# Patient Record
Sex: MALE | Race: WHITE | NOT HISPANIC OR LATINO | Employment: STUDENT | URBAN - METROPOLITAN AREA
[De-identification: names, ages, dates, MRNs, and addresses within clinical notes are randomized per-mention and may not be internally consistent; named-entity substitution may affect disease eponyms.]

---

## 2024-08-05 ENCOUNTER — OFFICE VISIT (OUTPATIENT)
Dept: GASTROENTEROLOGY | Facility: CLINIC | Age: 19
End: 2024-08-05
Payer: COMMERCIAL

## 2024-08-05 ENCOUNTER — HOSPITAL ENCOUNTER (EMERGENCY)
Facility: HOSPITAL | Age: 19
Discharge: HOME | End: 2024-08-05
Attending: EMERGENCY MEDICINE
Payer: COMMERCIAL

## 2024-08-05 ENCOUNTER — HOSPITAL ENCOUNTER (OUTPATIENT)
Dept: RADIOLOGY | Facility: HOSPITAL | Age: 19
Discharge: HOME | End: 2024-08-05
Payer: COMMERCIAL

## 2024-08-05 ENCOUNTER — HOSPITAL ENCOUNTER (INPATIENT)
Facility: HOSPITAL | Age: 19
LOS: 1 days | Discharge: HOME | End: 2024-08-06
Attending: EMERGENCY MEDICINE | Admitting: SURGERY
Payer: COMMERCIAL

## 2024-08-05 ENCOUNTER — DOCUMENTATION (OUTPATIENT)
Dept: GASTROENTEROLOGY | Facility: CLINIC | Age: 19
End: 2024-08-05
Payer: COMMERCIAL

## 2024-08-05 VITALS
SYSTOLIC BLOOD PRESSURE: 101 MMHG | DIASTOLIC BLOOD PRESSURE: 66 MMHG | HEART RATE: 74 BPM | WEIGHT: 166 LBS | TEMPERATURE: 98.4 F | BODY MASS INDEX: 25.16 KG/M2 | HEIGHT: 68 IN

## 2024-08-05 VITALS
RESPIRATION RATE: 16 BRPM | BODY MASS INDEX: 25.01 KG/M2 | SYSTOLIC BLOOD PRESSURE: 141 MMHG | WEIGHT: 165 LBS | DIASTOLIC BLOOD PRESSURE: 86 MMHG | OXYGEN SATURATION: 96 % | HEIGHT: 68 IN | TEMPERATURE: 97.6 F | HEART RATE: 86 BPM

## 2024-08-05 DIAGNOSIS — R10.813 RIGHT LOWER QUADRANT ABDOMINAL TENDERNESS, REBOUND TENDERNESS PRESENCE NOT SPECIFIED: ICD-10-CM

## 2024-08-05 DIAGNOSIS — R10.2 SUPRAPUBIC ABDOMINAL PAIN: ICD-10-CM

## 2024-08-05 DIAGNOSIS — R10.2 SUPRAPUBIC ABDOMINAL PAIN: Primary | ICD-10-CM

## 2024-08-05 DIAGNOSIS — R10.33 PERIUMBILICAL ABDOMINAL PAIN: ICD-10-CM

## 2024-08-05 DIAGNOSIS — K35.80 ACUTE APPENDICITIS, UNSPECIFIED ACUTE APPENDICITIS TYPE: Primary | ICD-10-CM

## 2024-08-05 DIAGNOSIS — R10.33 PERIUMBILICAL ABDOMINAL PAIN: Primary | ICD-10-CM

## 2024-08-05 DIAGNOSIS — D72.829 LEUKOCYTOSIS, UNSPECIFIED TYPE: ICD-10-CM

## 2024-08-05 LAB
ABO GROUP (TYPE) IN BLOOD: NORMAL
ALBUMIN SERPL BCP-MCNC: 4.7 G/DL (ref 3.4–5)
ALP SERPL-CCNC: 50 U/L (ref 33–120)
ALT SERPL W P-5'-P-CCNC: 11 U/L (ref 10–52)
ANION GAP SERPL CALC-SCNC: 13 MMOL/L (ref 10–20)
ANION GAP SERPL CALC-SCNC: 14 MMOL/L (ref 10–20)
ANTIBODY SCREEN: NORMAL
AST SERPL W P-5'-P-CCNC: 15 U/L (ref 9–39)
BASOPHILS # BLD AUTO: 0.04 X10*3/UL (ref 0–0.1)
BASOPHILS # BLD AUTO: 0.08 X10*3/UL (ref 0–0.1)
BASOPHILS NFR BLD AUTO: 0.3 %
BASOPHILS NFR BLD AUTO: 0.6 %
BILIRUB SERPL-MCNC: 0.9 MG/DL (ref 0–1.2)
BUN SERPL-MCNC: 13 MG/DL (ref 6–23)
BUN SERPL-MCNC: 9 MG/DL (ref 6–23)
CALCIUM SERPL-MCNC: 8.5 MG/DL (ref 8.6–10.6)
CALCIUM SERPL-MCNC: 9.9 MG/DL (ref 8.6–10.6)
CHLORIDE SERPL-SCNC: 101 MMOL/L (ref 98–107)
CHLORIDE SERPL-SCNC: 102 MMOL/L (ref 98–107)
CO2 SERPL-SCNC: 25 MMOL/L (ref 21–32)
CO2 SERPL-SCNC: 28 MMOL/L (ref 21–32)
CREAT SERPL-MCNC: 0.81 MG/DL (ref 0.5–1.3)
CREAT SERPL-MCNC: 0.89 MG/DL (ref 0.5–1.3)
EGFRCR SERPLBLD CKD-EPI 2021: >90 ML/MIN/1.73M*2
EGFRCR SERPLBLD CKD-EPI 2021: >90 ML/MIN/1.73M*2
EOSINOPHIL # BLD AUTO: 0.1 X10*3/UL (ref 0–0.7)
EOSINOPHIL # BLD AUTO: 0.16 X10*3/UL (ref 0–0.7)
EOSINOPHIL NFR BLD AUTO: 0.8 %
EOSINOPHIL NFR BLD AUTO: 1.1 %
ERYTHROCYTE [DISTWIDTH] IN BLOOD BY AUTOMATED COUNT: 12.6 % (ref 11.5–14.5)
ERYTHROCYTE [DISTWIDTH] IN BLOOD BY AUTOMATED COUNT: 12.7 % (ref 11.5–14.5)
GLUCOSE SERPL-MCNC: 108 MG/DL (ref 74–99)
GLUCOSE SERPL-MCNC: 94 MG/DL (ref 74–99)
HCT VFR BLD AUTO: 39.5 % (ref 41–52)
HCT VFR BLD AUTO: 46.5 % (ref 41–52)
HGB BLD-MCNC: 14 G/DL (ref 13.5–17.5)
HGB BLD-MCNC: 15.9 G/DL (ref 13.5–17.5)
IMM GRANULOCYTES # BLD AUTO: 0.03 X10*3/UL (ref 0–0.7)
IMM GRANULOCYTES # BLD AUTO: 0.04 X10*3/UL (ref 0–0.7)
IMM GRANULOCYTES NFR BLD AUTO: 0.2 % (ref 0–0.9)
IMM GRANULOCYTES NFR BLD AUTO: 0.3 % (ref 0–0.9)
LYMPHOCYTES # BLD AUTO: 1.23 X10*3/UL (ref 1.2–4.8)
LYMPHOCYTES # BLD AUTO: 1.43 X10*3/UL (ref 1.2–4.8)
LYMPHOCYTES NFR BLD AUTO: 10.3 %
LYMPHOCYTES NFR BLD AUTO: 9.9 %
MAGNESIUM SERPL-MCNC: 1.94 MG/DL (ref 1.6–2.4)
MCH RBC QN AUTO: 29.5 PG (ref 26–34)
MCH RBC QN AUTO: 30.3 PG (ref 26–34)
MCHC RBC AUTO-ENTMCNC: 34.2 G/DL (ref 32–36)
MCHC RBC AUTO-ENTMCNC: 35.4 G/DL (ref 32–36)
MCV RBC AUTO: 83 FL (ref 80–100)
MCV RBC AUTO: 89 FL (ref 80–100)
MONOCYTES # BLD AUTO: 0.97 X10*3/UL (ref 0.1–1)
MONOCYTES # BLD AUTO: 1.07 X10*3/UL (ref 0.1–1)
MONOCYTES NFR BLD AUTO: 7.4 %
MONOCYTES NFR BLD AUTO: 8.1 %
NEUTROPHILS # BLD AUTO: 11.65 X10*3/UL (ref 1.2–7.7)
NEUTROPHILS # BLD AUTO: 9.6 X10*3/UL (ref 1.2–7.7)
NEUTROPHILS NFR BLD AUTO: 80.2 %
NEUTROPHILS NFR BLD AUTO: 80.8 %
NRBC BLD-RTO: 0 /100 WBCS (ref 0–0)
NRBC BLD-RTO: 0 /100 WBCS (ref 0–0)
PLATELET # BLD AUTO: 280 X10*3/UL (ref 150–450)
PLATELET # BLD AUTO: 311 X10*3/UL (ref 150–450)
POTASSIUM SERPL-SCNC: 3.9 MMOL/L (ref 3.5–5.3)
POTASSIUM SERPL-SCNC: 4.4 MMOL/L (ref 3.5–5.3)
PROT SERPL-MCNC: 6.9 G/DL (ref 6.4–8.2)
RBC # BLD AUTO: 4.74 X10*6/UL (ref 4.5–5.9)
RBC # BLD AUTO: 5.25 X10*6/UL (ref 4.5–5.9)
RH FACTOR (ANTIGEN D): NORMAL
SODIUM SERPL-SCNC: 137 MMOL/L (ref 136–145)
SODIUM SERPL-SCNC: 138 MMOL/L (ref 136–145)
WBC # BLD AUTO: 12 X10*3/UL (ref 4.4–11.3)
WBC # BLD AUTO: 14.4 X10*3/UL (ref 4.4–11.3)

## 2024-08-05 PROCEDURE — 99223 1ST HOSP IP/OBS HIGH 75: CPT | Performed by: SURGERY

## 2024-08-05 PROCEDURE — 96365 THER/PROPH/DIAG IV INF INIT: CPT

## 2024-08-05 PROCEDURE — 2500000004 HC RX 250 GENERAL PHARMACY W/ HCPCS (ALT 636 FOR OP/ED)

## 2024-08-05 PROCEDURE — 2550000001 HC RX 255 CONTRASTS: Performed by: NURSE PRACTITIONER

## 2024-08-05 PROCEDURE — 2500000004 HC RX 250 GENERAL PHARMACY W/ HCPCS (ALT 636 FOR OP/ED): Performed by: STUDENT IN AN ORGANIZED HEALTH CARE EDUCATION/TRAINING PROGRAM

## 2024-08-05 PROCEDURE — 96361 HYDRATE IV INFUSION ADD-ON: CPT

## 2024-08-05 PROCEDURE — 82374 ASSAY BLOOD CARBON DIOXIDE: CPT | Performed by: STUDENT IN AN ORGANIZED HEALTH CARE EDUCATION/TRAINING PROGRAM

## 2024-08-05 PROCEDURE — 99204 OFFICE O/P NEW MOD 45 MIN: CPT | Performed by: NURSE PRACTITIONER

## 2024-08-05 PROCEDURE — 2500000001 HC RX 250 WO HCPCS SELF ADMINISTERED DRUGS (ALT 637 FOR MEDICARE OP)

## 2024-08-05 PROCEDURE — 99285 EMERGENCY DEPT VISIT HI MDM: CPT | Mod: 25

## 2024-08-05 PROCEDURE — 1036F TOBACCO NON-USER: CPT | Performed by: NURSE PRACTITIONER

## 2024-08-05 PROCEDURE — 99284 EMERGENCY DEPT VISIT MOD MDM: CPT | Performed by: EMERGENCY MEDICINE

## 2024-08-05 PROCEDURE — 74177 CT ABD & PELVIS W/CONTRAST: CPT

## 2024-08-05 PROCEDURE — 99214 OFFICE O/P EST MOD 30 MIN: CPT | Performed by: NURSE PRACTITIONER

## 2024-08-05 PROCEDURE — 85025 COMPLETE CBC W/AUTO DIFF WBC: CPT

## 2024-08-05 PROCEDURE — A9698 NON-RAD CONTRAST MATERIALNOC: HCPCS | Performed by: NURSE PRACTITIONER

## 2024-08-05 PROCEDURE — 80053 COMPREHEN METABOLIC PANEL: CPT

## 2024-08-05 PROCEDURE — 1210000001 HC SEMI-PRIVATE ROOM DAILY

## 2024-08-05 PROCEDURE — 96375 TX/PRO/DX INJ NEW DRUG ADDON: CPT

## 2024-08-05 PROCEDURE — 36415 COLL VENOUS BLD VENIPUNCTURE: CPT | Performed by: STUDENT IN AN ORGANIZED HEALTH CARE EDUCATION/TRAINING PROGRAM

## 2024-08-05 PROCEDURE — 80048 BASIC METABOLIC PNL TOTAL CA: CPT | Mod: CCI | Performed by: STUDENT IN AN ORGANIZED HEALTH CARE EDUCATION/TRAINING PROGRAM

## 2024-08-05 PROCEDURE — 2500000004 HC RX 250 GENERAL PHARMACY W/ HCPCS (ALT 636 FOR OP/ED): Performed by: EMERGENCY MEDICINE

## 2024-08-05 PROCEDURE — 36415 COLL VENOUS BLD VENIPUNCTURE: CPT

## 2024-08-05 PROCEDURE — 86901 BLOOD TYPING SEROLOGIC RH(D): CPT | Performed by: STUDENT IN AN ORGANIZED HEALTH CARE EDUCATION/TRAINING PROGRAM

## 2024-08-05 PROCEDURE — 74177 CT ABD & PELVIS W/CONTRAST: CPT | Performed by: RADIOLOGY

## 2024-08-05 PROCEDURE — 83735 ASSAY OF MAGNESIUM: CPT

## 2024-08-05 PROCEDURE — 85025 COMPLETE CBC W/AUTO DIFF WBC: CPT | Performed by: STUDENT IN AN ORGANIZED HEALTH CARE EDUCATION/TRAINING PROGRAM

## 2024-08-05 RX ORDER — ACETAMINOPHEN 325 MG/1
975 TABLET ORAL ONCE
Status: COMPLETED | OUTPATIENT
Start: 2024-08-05 | End: 2024-08-05

## 2024-08-05 RX ORDER — ENOXAPARIN SODIUM 100 MG/ML
40 INJECTION SUBCUTANEOUS DAILY
Status: DISCONTINUED | OUTPATIENT
Start: 2024-08-06 | End: 2024-08-07 | Stop reason: HOSPADM

## 2024-08-05 RX ORDER — ACETAMINOPHEN, DIPHENHYDRAMINE HCL, PHENYLEPHRINE HCL 325; 25; 5 MG/1; MG/1; MG/1
10 TABLET ORAL NIGHTLY PRN
COMMUNITY

## 2024-08-05 RX ORDER — KETOROLAC TROMETHAMINE 30 MG/ML
30 INJECTION, SOLUTION INTRAMUSCULAR; INTRAVENOUS ONCE
Status: COMPLETED | OUTPATIENT
Start: 2024-08-05 | End: 2024-08-05

## 2024-08-05 RX ORDER — DEXTROAMPHETAMINE SACCHARATE, AMPHETAMINE ASPARTATE, DEXTROAMPHETAMINE SULFATE AND AMPHETAMINE SULFATE 2.5; 2.5; 2.5; 2.5 MG/1; MG/1; MG/1; MG/1
TABLET ORAL
COMMUNITY
Start: 2023-08-16 | End: 2024-08-05

## 2024-08-05 RX ORDER — METHYLPHENIDATE HYDROCHLORIDE 36 MG/1
36 TABLET ORAL EVERY MORNING
COMMUNITY

## 2024-08-05 RX ORDER — OXYCODONE HYDROCHLORIDE 5 MG/1
5 TABLET ORAL EVERY 6 HOURS PRN
Status: DISCONTINUED | OUTPATIENT
Start: 2024-08-05 | End: 2024-08-06

## 2024-08-05 RX ORDER — NALOXONE HYDROCHLORIDE 0.4 MG/ML
0.2 INJECTION, SOLUTION INTRAMUSCULAR; INTRAVENOUS; SUBCUTANEOUS EVERY 5 MIN PRN
Status: DISCONTINUED | OUTPATIENT
Start: 2024-08-05 | End: 2024-08-06

## 2024-08-05 RX ORDER — DIPHENHYDRAMINE HYDROCHLORIDE 50 MG/ML
25 INJECTION INTRAMUSCULAR; INTRAVENOUS ONCE
Status: DISCONTINUED | OUTPATIENT
Start: 2024-08-05 | End: 2024-08-05

## 2024-08-05 RX ORDER — ONDANSETRON 4 MG/1
4 TABLET, ORALLY DISINTEGRATING ORAL EVERY 8 HOURS PRN
Status: DISCONTINUED | OUTPATIENT
Start: 2024-08-05 | End: 2024-08-07 | Stop reason: HOSPADM

## 2024-08-05 RX ORDER — SODIUM CHLORIDE, SODIUM LACTATE, POTASSIUM CHLORIDE, CALCIUM CHLORIDE 600; 310; 30; 20 MG/100ML; MG/100ML; MG/100ML; MG/100ML
100 INJECTION, SOLUTION INTRAVENOUS CONTINUOUS
Status: DISCONTINUED | OUTPATIENT
Start: 2024-08-05 | End: 2024-08-06

## 2024-08-05 RX ORDER — DICYCLOMINE HYDROCHLORIDE 10 MG/1
10 CAPSULE ORAL 4 TIMES DAILY
Qty: 120 CAPSULE | Refills: 0 | Status: SHIPPED | OUTPATIENT
Start: 2024-08-05 | End: 2024-08-06 | Stop reason: HOSPADM

## 2024-08-05 RX ORDER — ACETAMINOPHEN 325 MG/1
650 TABLET ORAL EVERY 4 HOURS PRN
Status: DISCONTINUED | OUTPATIENT
Start: 2024-08-05 | End: 2024-08-07 | Stop reason: HOSPADM

## 2024-08-05 RX ORDER — AMOXICILLIN 250 MG
2 CAPSULE ORAL 2 TIMES DAILY
Status: DISCONTINUED | OUTPATIENT
Start: 2024-08-05 | End: 2024-08-07 | Stop reason: HOSPADM

## 2024-08-05 RX ORDER — KETOROLAC TROMETHAMINE 30 MG/ML
INJECTION, SOLUTION INTRAMUSCULAR; INTRAVENOUS
Status: COMPLETED
Start: 2024-08-05 | End: 2024-08-05

## 2024-08-05 RX ORDER — OXYCODONE HYDROCHLORIDE 5 MG/1
10 TABLET ORAL EVERY 4 HOURS PRN
Status: DISCONTINUED | OUTPATIENT
Start: 2024-08-05 | End: 2024-08-06

## 2024-08-05 RX ORDER — DICYCLOMINE HYDROCHLORIDE 10 MG/1
10 CAPSULE ORAL ONCE
Status: COMPLETED | OUTPATIENT
Start: 2024-08-05 | End: 2024-08-05

## 2024-08-05 RX ORDER — DEXTROSE, SODIUM CHLORIDE, SODIUM LACTATE, POTASSIUM CHLORIDE, AND CALCIUM CHLORIDE 5; .6; .31; .03; .02 G/100ML; G/100ML; G/100ML; G/100ML; G/100ML
100 INJECTION, SOLUTION INTRAVENOUS CONTINUOUS
Status: DISCONTINUED | OUTPATIENT
Start: 2024-08-05 | End: 2024-08-05

## 2024-08-05 RX ORDER — ONDANSETRON HYDROCHLORIDE 2 MG/ML
4 INJECTION, SOLUTION INTRAVENOUS EVERY 8 HOURS PRN
Status: DISCONTINUED | OUTPATIENT
Start: 2024-08-05 | End: 2024-08-06

## 2024-08-05 RX ORDER — HYDROMORPHONE HYDROCHLORIDE 1 MG/ML
0.5 INJECTION, SOLUTION INTRAMUSCULAR; INTRAVENOUS; SUBCUTANEOUS ONCE
Status: COMPLETED | OUTPATIENT
Start: 2024-08-05 | End: 2024-08-05

## 2024-08-05 RX ORDER — METHYLPHENIDATE HYDROCHLORIDE 10 MG/1
10 TABLET ORAL DAILY PRN
COMMUNITY

## 2024-08-05 ASSESSMENT — PAIN DESCRIPTION - LOCATION
LOCATION: ABDOMEN

## 2024-08-05 ASSESSMENT — PAIN - FUNCTIONAL ASSESSMENT
PAIN_FUNCTIONAL_ASSESSMENT: 0-10

## 2024-08-05 ASSESSMENT — ENCOUNTER SYMPTOMS
MYALGIAS: 0
PALPITATIONS: 0
PHOTOPHOBIA: 0
NUMBNESS: 0
DYSURIA: 0
DIZZINESS: 0
LIGHT-HEADEDNESS: 0
SORE THROAT: 0
BACK PAIN: 0
EYE PAIN: 0
WHEEZING: 0
FEVER: 0
FATIGUE: 0
FREQUENCY: 0
CHILLS: 0
HEMATURIA: 0
WEAKNESS: 0
FLANK PAIN: 0
HALLUCINATIONS: 0
ADENOPATHY: 0
COUGH: 0
SHORTNESS OF BREATH: 0
DIAPHORESIS: 0
NERVOUS/ANXIOUS: 0
JOINT SWELLING: 0
ARTHRALGIAS: 0
AGITATION: 0

## 2024-08-05 ASSESSMENT — PAIN SCALES - GENERAL
PAINLEVEL_OUTOF10: 8
PAINLEVEL_OUTOF10: 8
PAINLEVEL_OUTOF10: 5 - MODERATE PAIN
PAINLEVEL_OUTOF10: 9

## 2024-08-05 ASSESSMENT — COLUMBIA-SUICIDE SEVERITY RATING SCALE - C-SSRS
2. HAVE YOU ACTUALLY HAD ANY THOUGHTS OF KILLING YOURSELF?: NO
2. HAVE YOU ACTUALLY HAD ANY THOUGHTS OF KILLING YOURSELF?: NO
1. IN THE PAST MONTH, HAVE YOU WISHED YOU WERE DEAD OR WISHED YOU COULD GO TO SLEEP AND NOT WAKE UP?: NO
1. IN THE PAST MONTH, HAVE YOU WISHED YOU WERE DEAD OR WISHED YOU COULD GO TO SLEEP AND NOT WAKE UP?: NO
6. HAVE YOU EVER DONE ANYTHING, STARTED TO DO ANYTHING, OR PREPARED TO DO ANYTHING TO END YOUR LIFE?: NO
6. HAVE YOU EVER DONE ANYTHING, STARTED TO DO ANYTHING, OR PREPARED TO DO ANYTHING TO END YOUR LIFE?: NO

## 2024-08-05 ASSESSMENT — PAIN DESCRIPTION - ORIENTATION: ORIENTATION: LOWER

## 2024-08-05 ASSESSMENT — PAIN DESCRIPTION - PAIN TYPE
TYPE: ACUTE PAIN

## 2024-08-05 ASSESSMENT — PAIN DESCRIPTION - DESCRIPTORS
DESCRIPTORS: SHARP
DESCRIPTORS: SHARP

## 2024-08-05 NOTE — PROGRESS NOTES
Received results from pt's STAT CT A/P with IV contrast just completed which notes acute uncomplicated appendicitis. I anticipate the pt will require appendectomy. I advised pt  to return to the [facility] ED where he was seen earlier this morning. Pt states he is on his way to the ED and should be there in ~35 minutes. I discuss with Dr. Palafox in the ED.

## 2024-08-05 NOTE — PROGRESS NOTES
Received call from answering service patient looking to discuss his test results    Reviewed chart. Seen in GI clinic today. Had CT scan today showing acute appendicitis after being seen in ED and subsequently GI clinic. Has leukocytosis of 14 on labs this morning.    I called patient and spoke with him over the phone. He had just gotten off the phone with GI provider Justin who advised him to go to the ED at [facility] for acute appendicitis. Patient is on the way there now and is 15 minutes away. Will need surgical consultation on arrival;

## 2024-08-05 NOTE — ED PROVIDER NOTES
History of Present Illness     History provided by: Patient  Limitations to History: None    HPI:  Gene Rodriguez is a 19 y.o. male past medical history of ADHD, and food allergies presents to the emergency department today with concern for lower abdominal pain that started approximately 9 PM last night he notes that the pain onset was sudden, woke him up out of sleep and was unable to sleep.  Has been urinating normally denies any discharge.  Notes that he also has been having normal bowel movements last normal bowel movement was around 8:30 PM yesterday..  Denies any nausea, vomiting, fever, chills, cough, URI-like symptoms or diarrhea.  Notes that he had similar symptoms several months ago while he was visiting [facility] at that time was told that it might have been a food allergy or norovirus however at that time the patient was experiencing additional symptoms such as nausea, vomiting, diarrhea.  Denies any injuries or trauma to the area.  Denies any surgical history.  No other associated signs or symptoms reported at this time.    Physical Exam   Triage vitals:  T 36.4 °C (97.6 °F)  HR 86  /86  RR 16  O2 96 % None (Room air)    Physical Exam  Vitals and nursing note reviewed. Exam conducted with a chaperone present.   Constitutional:       General: He is not in acute distress.     Appearance: Normal appearance. He is not toxic-appearing.   HENT:      Head: Normocephalic and atraumatic.      Mouth/Throat:      Mouth: Mucous membranes are moist.   Eyes:      General: No scleral icterus.     Conjunctiva/sclera: Conjunctivae normal.   Cardiovascular:      Rate and Rhythm: Normal rate and regular rhythm.      Pulses: Normal pulses.   Pulmonary:      Effort: Pulmonary effort is normal.      Breath sounds: Normal breath sounds.   Abdominal:      General: Bowel sounds are normal. There is no distension.      Palpations: Abdomen is soft.      Tenderness: There is no abdominal tenderness.   Genitourinary:      Testes: Normal.         Right: Mass, tenderness or swelling not present.         Left: Mass, tenderness or swelling not present.   Musculoskeletal:         General: Normal range of motion.      Cervical back: Normal range of motion and neck supple.   Skin:     General: Skin is warm and dry.      Capillary Refill: Capillary refill takes less than 2 seconds.   Neurological:      General: No focal deficit present.      Mental Status: He is alert.   Psychiatric:         Mood and Affect: Mood normal.         Behavior: Behavior normal.         Thought Content: Thought content normal.         Judgment: Judgment normal.          Medical Decision Making & ED Course   Medical Decision Makin y.o. male coming in with concern for lower abdominal pain that came on around 9 PM and has progressively gotten worse.  Testicular examination that was chaperoned by the nurse was done without any tenderness or swelling noted in the testicles.  Patient states the pain is not reproducible exam on examination however he feels it internally in its more periumbilical.  Was given a dose of Bentyl which he noted improved his symptoms however when I was getting ready to discharge him he noted the pain was slightly coming back.  Was given a dose of Toradol.  Appointments were set up with primary care in addition to GI prior to discharge.  The patient has a GI appointment at 2 PM today.  Patient's labs otherwise reassuring.  The patient has a leukocyte count could be related to viral syndrome versus inflammatory reaction.  Patient's electrolytes otherwise within normal limits.  Testicular torsion was considered however based off my examination and the patient's testicles without any concerning findings and ultrasound was deferred and the left less likely.  Additionally the patient had no reproducible pain on my examination, and notes that his pain is not lateralized to one location over the other it is more midline and internal and not  reproducible with palpation, lower concern for appendicitis.  Patient given a prescription for Bentyl in addition to follow-up appointments that were set up prior to discharge from the hospital.  Patient was provided with strict return precautions in addition to follow-up instructions that he is agreeable with.  Patient discharged home in stable condition.  ----    Differential diagnoses considered include but are not limited to: Viral syndrome, electrolyte abnormalities, testicular torsion, appendicitis    Independent Result Review and Interpretation: See ED course    Chronic conditions affecting the patient's care: See HPI    The patient was discussed with the following consultants/services: None    Care Considerations: See MDM    ED Course:  Diagnoses as of 08/05/24 0650   Periumbilical abdominal pain     Disposition   As a result of the work-up, the patient was discharged home.  he was informed of his diagnosis and instructed to come back with any concerns or worsening of condition.  he and was agreeable to the plan as discussed above.  he was given the opportunity to ask questions.  All of the patient's questions were answered.        Seen and discussed with ED Attending  Rosalie Allen DO, PGY-2  Emergency Medicine        ATTENDING ATTESTATION  19-year-old male presenting to the emergency department complaint of abdominal pain.  Relatively sudden onset at about 9 PM mild gnawing and aching in quality suprapubic without urinary or testicular symptoms gradually progressing to be a more constant pain and ache.  On arrival the patient was in obvious discomfort immediately received a sensitive examination that was negative for any evidence of testicular pathology such as torsion epididymitis or orchitis.  Patient was administered Bentyl with some improvement of his symptoms, no tenderness on physical exam his abdomen is soft without guarding or rigidity he states that the pain is not able to be physically  reproduced.  The patient has symmetric strength and sensation in the lower extremities, warm well-perfused legs strong with symmetric DP and PT pulses with low concern for any primary abdominal or lower extremity vascular pathology such as dissection or occlusion.  At the time I interviewed the patient he was having some improvement states that the Bentyl is working.  He has had similar events with variable symptoms presenting over the past several years since he was 4 typically has allergies to multiple foods such as sugar alcohols however, he states that with prior exacerbations while the pain intensity has been similar it is almost always accompanied with nausea and loose stools and this time he has not had either just the pain.  Reassuringly the patient's pain presentation is typical for prior exacerbations however, he does not feel that this is his allergies he was mindful of his food intake this evening having only tortilla chips and cashews.  Nonetheless, the patient is well-appearing at the bedside he is getting symptomatic relief from the Bentyl his abdomen is soft there is no guarding or rigidity certainly no tenderness over the appendix of the gallbladder doubt any interabdominal pathology, furthermore the patient has no discomfort in the back no falls or injuries I doubt any retroperitoneal pathology.  We will check electrolytes blood counts reassess the patient's clinical response to therapy.  Patient would benefit from outpatient follow-up with GI, there may be a functional component to this however, the patient needs further evaluation I do anticipate a safe dispo home.    Carmelo Mccall, [facility]  [facility]    The patient was seen by the resident/fellow.  I have personally performed a substantive portion of the encounter.  I have seen and examined the patient; agree with the workup, evaluation, MDM, management and diagnosis.    I have reviewed  all the nurses' notes and have confirmed their findings, and have incorporated those findings into this medical record.   The care plan has been discussed with the resident/fellow; I have reviewed the resident/fellow’s note and agree with the documented findings with the exception/addition of information listed above.  On my own examination I agree and incorporated in this document my own history, examination findings and clinical decision making.  All notation in this Addendum supersedes information presented by the resident or TORIE as listed above.        Rosalie Allen DO  Resident  08/05/24 0653

## 2024-08-05 NOTE — PATIENT INSTRUCTIONS
Thanks for coming to the GI clinic.     I would like you to get a STAT CT scan. Please call [phone] to schedule.     If your pain worsens before you are able to get in for the CT scan, go back to the ER.     Follow up will be based upon the above.

## 2024-08-05 NOTE — ED TRIAGE NOTES
"Patient presents to the ED for evaluation of lower abdominal pain that began last night. Patient reports that he all of a sudden developed lower abdominal pain that he thought was \"hunger pains\". He states that he did eat, but the pain continued. Patient reports that the pain in his lower abdomen is sharp, but does not radiate. Patient also has a \"minor\" headache.   "

## 2024-08-05 NOTE — PROGRESS NOTES
"Subjective   Patient ID: Gene Rodriguez is a 19 y.o. male who presents for abdominal pain.     This is a 19 year old WM with history of ADHD who is presenting to the GI clinic for an initial visit.     History per pt and review of EMR     [phone] (pt's cell phone)     Reports a year ago while traveling to [facility] he had issues with gas and constipation (took GasX at that time when helped)    More recently, he reports being hospitalized in [place] with a fever of 104 F, constipation, and vomiting (was traveling with parents).  Was told he possibly had Norovirus in [place].     Reports yesterday at 9 PM he developed constant suprapubic abdominal pain which he's never had before. States the abdominal pain feels like when you get \"kicked in the balls\" or \"you have to pee\". The pain woke him from sleep at 1 AM and 2:30 AM. The pain is better if he puts his knees to his chest.     He had a minor headache when the abdominal pain started, but he attributes this to not eating much as that time. He gets headaches from time to time.     He ate cashews and tortilla chips prior to when pain started. He drank beckie tea prior to when pain started.     He was seen in the [facility] ED this morning for the above suprapubic abdominal pain for which he was given dicyclomine with only temporary relief. He was noted to have a mild leukocytosis of 14.4 at that time, but otherwise blood work including CMP was unrevealing. He was referred to GI.     Last BM was yesterday at 8:30 PM.     Denies heartburn, N/V, diarrhea, constipation, hematemesis hematochezia, and melena.       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   HENT:  Negative for congestion, ear pain, hearing loss, sneezing and sore throat.    Eyes:  Negative for photophobia, pain and visual disturbance.   Respiratory:  Negative for cough, shortness of breath and wheezing.    Cardiovascular:  Negative for chest pain, palpitations and leg swelling.   Endocrine: Negative for cold intolerance and heat intolerance.   Genitourinary:  Negative for dysuria, flank pain, frequency and hematuria.   Musculoskeletal:  Negative for arthralgias, back pain, gait problem, joint swelling and myalgias.   Skin:  Negative for rash.   Neurological:  Negative for dizziness, syncope, weakness, light-headedness and numbness.   Hematological:  Negative for adenopathy.   Psychiatric/Behavioral:  Negative for agitation and hallucinations. The patient is not nervous/anxious.        Allergies   Allergen Reactions    Aspartame Rash    Sorbitol Rash       No current facility-administered medications for this visit.     No current outpatient medications on file.     Facility-Administered Medications Ordered in Other Visits   Medication Dose Route Frequency Provider Last Rate Last Admin    acetaminophen (Tylenol) tablet 650 mg  650 mg oral q4h PRN Eden Reese MD        enoxaparin (Lovenox) syringe 40 mg  40 mg subcutaneous Daily Eden Reese MD        lactated Ringer's infusion  100 mL/hr intravenous Continuous Eden Reese  mL/hr at 08/06/24 0621 100 mL/hr at 08/06/24 0621    naloxone (Narcan) injection 0.2 mg  0.2 mg intravenous q5 min PRN Eden Reees MD        ondansetron ODT (Zofran-ODT) disintegrating tablet 4 mg  4 mg oral q8h PRN Eden Reese MD        Or    ondansetron (Zofran) injection 4 mg  4 mg intravenous q8h PRN Eden Reese MD         "oxyCODONE (Roxicodone) immediate release tablet 10 mg  10 mg oral q4h PRN Eden Reese MD        oxyCODONE (Roxicodone) immediate release tablet 5 mg  5 mg oral q6h PRN Eden Reese MD   5 mg at 08/05/24 2228    piperacillin-tazobactam (Zosyn) 3.375 g in dextrose (iso) IV 50 mL  3.375 g intravenous q6h Eden Reese MD   Stopped at 08/06/24 0228    sennosides-docusate sodium (Meagan-Colace) 8.6-50 mg per tablet 2 tablet  2 tablet oral BID Eden Reese MD            Objective     /66   Pulse 74   Temp 36.9 °C (98.4 °F)   Ht 1.727 m (5' 8\")   Wt 75.3 kg (166 lb)   BMI 25.24 kg/m²     Physical Exam  Constitutional:       General: He is not in acute distress.  HENT:      Head: Normocephalic and atraumatic.   Eyes:      Conjunctiva/sclera: Conjunctivae normal.   Cardiovascular:      Rate and Rhythm: Normal rate and regular rhythm.      Heart sounds: No murmur heard.     No gallop.   Pulmonary:      Effort: Pulmonary effort is normal.      Breath sounds: Normal breath sounds.   Abdominal:      General: Bowel sounds are normal. There is no distension.      Tenderness: There is no guarding. Negative signs include psoas sign and obturator sign.      Comments: Bilateral lower abdomianl tenderness more pronounced  in RLQ       Musculoskeletal:         General: No swelling or deformity. Normal range of motion.      Cervical back: Normal range of motion. No rigidity.   Skin:     General: Skin is warm and dry.      Coloration: Skin is not jaundiced.      Findings: No lesion or rash.   Neurological:      General: No focal deficit present.      Mental Status: He is alert and oriented to person, place, and time.   Psychiatric:         Mood and Affect: Mood normal.         Assessment/Plan   Problem List Items Addressed This Visit    None  Visit Diagnoses       Suprapubic abdominal pain    -  Primary    Relevant Orders    CT abdomen pelvis w IV contrast (Completed)    Periumbilical abdominal pain        " Right lower quadrant abdominal tenderness, rebound tenderness presence not specified        Relevant Orders    CT abdomen pelvis w IV contrast (Completed)    Leukocytosis, unspecified type               Suprapubic abdominal pain: with leukocytosis and lower abdominal tenderness (more pronounced in RLQ), I am concerned about acute appendicitis.   - will proceed with STAT CT A/P with IV contrast    2. Follow up:  - will be based upon the above

## 2024-08-06 ENCOUNTER — PHARMACY VISIT (OUTPATIENT)
Dept: PHARMACY | Facility: CLINIC | Age: 19
End: 2024-08-06
Payer: COMMERCIAL

## 2024-08-06 VITALS
DIASTOLIC BLOOD PRESSURE: 78 MMHG | SYSTOLIC BLOOD PRESSURE: 111 MMHG | OXYGEN SATURATION: 94 % | TEMPERATURE: 97.9 F | HEART RATE: 59 BPM | BODY MASS INDEX: 25.81 KG/M2 | HEIGHT: 68 IN | WEIGHT: 170.31 LBS | RESPIRATION RATE: 18 BRPM

## 2024-08-06 LAB
ALBUMIN SERPL BCP-MCNC: 3.7 G/DL (ref 3.4–5)
ANION GAP SERPL CALC-SCNC: 12 MMOL/L (ref 10–20)
BUN SERPL-MCNC: 9 MG/DL (ref 6–23)
CALCIUM SERPL-MCNC: 9 MG/DL (ref 8.6–10.6)
CHLORIDE SERPL-SCNC: 104 MMOL/L (ref 98–107)
CO2 SERPL-SCNC: 29 MMOL/L (ref 21–32)
CREAT SERPL-MCNC: 0.97 MG/DL (ref 0.5–1.3)
EGFRCR SERPLBLD CKD-EPI 2021: >90 ML/MIN/1.73M*2
ERYTHROCYTE [DISTWIDTH] IN BLOOD BY AUTOMATED COUNT: 12.8 % (ref 11.5–14.5)
GLUCOSE SERPL-MCNC: 83 MG/DL (ref 74–99)
HCT VFR BLD AUTO: 39.3 % (ref 41–52)
HGB BLD-MCNC: 12.7 G/DL (ref 13.5–17.5)
MAGNESIUM SERPL-MCNC: 2.12 MG/DL (ref 1.6–2.4)
MCH RBC QN AUTO: 29.2 PG (ref 26–34)
MCHC RBC AUTO-ENTMCNC: 32.3 G/DL (ref 32–36)
MCV RBC AUTO: 90 FL (ref 80–100)
NRBC BLD-RTO: 0 /100 WBCS (ref 0–0)
PHOSPHATE SERPL-MCNC: 4.5 MG/DL (ref 2.5–4.9)
PLATELET # BLD AUTO: 225 X10*3/UL (ref 150–450)
POTASSIUM SERPL-SCNC: 4 MMOL/L (ref 3.5–5.3)
RBC # BLD AUTO: 4.35 X10*6/UL (ref 4.5–5.9)
SODIUM SERPL-SCNC: 141 MMOL/L (ref 136–145)
WBC # BLD AUTO: 8 X10*3/UL (ref 4.4–11.3)

## 2024-08-06 PROCEDURE — 80069 RENAL FUNCTION PANEL: CPT

## 2024-08-06 PROCEDURE — 83735 ASSAY OF MAGNESIUM: CPT

## 2024-08-06 PROCEDURE — 36415 COLL VENOUS BLD VENIPUNCTURE: CPT

## 2024-08-06 PROCEDURE — 85027 COMPLETE CBC AUTOMATED: CPT

## 2024-08-06 PROCEDURE — 99239 HOSP IP/OBS DSCHRG MGMT >30: CPT | Performed by: STUDENT IN AN ORGANIZED HEALTH CARE EDUCATION/TRAINING PROGRAM

## 2024-08-06 PROCEDURE — 2500000001 HC RX 250 WO HCPCS SELF ADMINISTERED DRUGS (ALT 637 FOR MEDICARE OP)

## 2024-08-06 PROCEDURE — 2500000004 HC RX 250 GENERAL PHARMACY W/ HCPCS (ALT 636 FOR OP/ED)

## 2024-08-06 PROCEDURE — RXMED WILLOW AMBULATORY MEDICATION CHARGE

## 2024-08-06 RX ORDER — AMOXICILLIN AND CLAVULANATE POTASSIUM 875; 125 MG/1; MG/1
1 TABLET, FILM COATED ORAL 2 TIMES DAILY
Qty: 14 TABLET | Refills: 0 | Status: SHIPPED | OUTPATIENT
Start: 2024-08-06 | End: 2024-08-13

## 2024-08-06 SDOH — SOCIAL STABILITY: SOCIAL INSECURITY: WERE YOU ABLE TO COMPLETE ALL THE BEHAVIORAL HEALTH SCREENINGS?: YES

## 2024-08-06 SDOH — ECONOMIC STABILITY: TRANSPORTATION INSECURITY
IN THE PAST 12 MONTHS, HAS LACK OF TRANSPORTATION KEPT YOU FROM MEETINGS, WORK, OR FROM GETTING THINGS NEEDED FOR DAILY LIVING?: NO

## 2024-08-06 SDOH — SOCIAL STABILITY: SOCIAL INSECURITY: DOES ANYONE TRY TO KEEP YOU FROM HAVING/CONTACTING OTHER FRIENDS OR DOING THINGS OUTSIDE YOUR HOME?: NO

## 2024-08-06 SDOH — HEALTH STABILITY: PHYSICAL HEALTH: ON AVERAGE, HOW MANY DAYS PER WEEK DO YOU ENGAGE IN MODERATE TO STRENUOUS EXERCISE (LIKE A BRISK WALK)?: 7 DAYS

## 2024-08-06 SDOH — ECONOMIC STABILITY: INCOME INSECURITY: HOW HARD IS IT FOR YOU TO PAY FOR THE VERY BASICS LIKE FOOD, HOUSING, MEDICAL CARE, AND HEATING?: NOT HARD AT ALL

## 2024-08-06 SDOH — ECONOMIC STABILITY: INCOME INSECURITY: IN THE LAST 12 MONTHS, WAS THERE A TIME WHEN YOU WERE NOT ABLE TO PAY THE MORTGAGE OR RENT ON TIME?: NO

## 2024-08-06 SDOH — SOCIAL STABILITY: SOCIAL INSECURITY: ARE THERE ANY APPARENT SIGNS OF INJURIES/BEHAVIORS THAT COULD BE RELATED TO ABUSE/NEGLECT?: NO

## 2024-08-06 SDOH — SOCIAL STABILITY: SOCIAL INSECURITY: ABUSE: ADULT

## 2024-08-06 SDOH — SOCIAL STABILITY: SOCIAL INSECURITY: ARE YOU OR HAVE YOU BEEN THREATENED OR ABUSED PHYSICALLY, EMOTIONALLY, OR SEXUALLY BY ANYONE?: NO

## 2024-08-06 SDOH — ECONOMIC STABILITY: HOUSING INSECURITY: AT ANY TIME IN THE PAST 12 MONTHS, WERE YOU HOMELESS OR LIVING IN A SHELTER (INCLUDING NOW)?: NO

## 2024-08-06 SDOH — SOCIAL STABILITY: SOCIAL INSECURITY: HAVE YOU HAD THOUGHTS OF HARMING ANYONE ELSE?: NO

## 2024-08-06 SDOH — SOCIAL STABILITY: SOCIAL INSECURITY: DO YOU FEEL ANYONE HAS EXPLOITED OR TAKEN ADVANTAGE OF YOU FINANCIALLY OR OF YOUR PERSONAL PROPERTY?: NO

## 2024-08-06 SDOH — HEALTH STABILITY: PHYSICAL HEALTH: ON AVERAGE, HOW MANY MINUTES DO YOU ENGAGE IN EXERCISE AT THIS LEVEL?: 40 MIN

## 2024-08-06 SDOH — SOCIAL STABILITY: SOCIAL INSECURITY: HAVE YOU HAD ANY THOUGHTS OF HARMING ANYONE ELSE?: NO

## 2024-08-06 SDOH — SOCIAL STABILITY: SOCIAL INSECURITY: DO YOU FEEL UNSAFE GOING BACK TO THE PLACE WHERE YOU ARE LIVING?: NO

## 2024-08-06 SDOH — ECONOMIC STABILITY: HOUSING INSECURITY: IN THE PAST 12 MONTHS, HOW MANY TIMES HAVE YOU MOVED WHERE YOU WERE LIVING?: 1

## 2024-08-06 SDOH — ECONOMIC STABILITY: TRANSPORTATION INSECURITY
IN THE PAST 12 MONTHS, HAS THE LACK OF TRANSPORTATION KEPT YOU FROM MEDICAL APPOINTMENTS OR FROM GETTING MEDICATIONS?: NO

## 2024-08-06 SDOH — SOCIAL STABILITY: SOCIAL INSECURITY: HAS ANYONE EVER THREATENED TO HURT YOUR FAMILY OR YOUR PETS?: NO

## 2024-08-06 ASSESSMENT — COGNITIVE AND FUNCTIONAL STATUS - GENERAL
DAILY ACTIVITIY SCORE: 24
MOBILITY SCORE: 24
PATIENT BASELINE BEDBOUND: NO
DAILY ACTIVITIY SCORE: 24

## 2024-08-06 ASSESSMENT — LIFESTYLE VARIABLES
HOW OFTEN DO YOU HAVE A DRINK CONTAINING ALCOHOL: 2-3 TIMES A WEEK
HOW OFTEN DURING THE LAST YEAR HAVE YOU NEEDED AN ALCOHOLIC DRINK FIRST THING IN THE MORNING TO GET YOURSELF GOING AFTER A NIGHT OF HEAVY DRINKING: LESS THAN MONTHLY
AUDIT-C TOTAL SCORE: 5
HOW MANY STANDARD DRINKS CONTAINING ALCOHOL DO YOU HAVE ON A TYPICAL DAY: 1 OR 2
HOW OFTEN DURING THE LAST YEAR HAVE YOU FAILED TO DO WHAT WAS NORMALLY EXPECTED FROM YOU BECAUSE OF DRINKING: LESS THAN MONTHLY
AUDIT TOTAL SCORE: 5
HOW OFTEN DURING THE LAST YEAR HAVE YOU FOUND THAT YOU WERE NOT ABLE TO STOP DRINKING ONCE YOU HAD STARTED: LESS THAN MONTHLY
AUDIT TOTAL SCORE: 10
SUBSTANCE_ABUSE_PAST_12_MONTHS: YES
TOTAL SCORE: 0
HAVE PEOPLE ANNOYED YOU BY CRITICIZING YOUR DRINKING: NO
HAS A RELATIVE, FRIEND, DOCTOR, OR ANOTHER HEALTH PROFESSIONAL EXPRESSED CONCERN ABOUT YOUR DRINKING OR SUGGESTED YOU CUT DOWN: NO
AUDIT-C TOTAL SCORE: 5
SKIP TO QUESTIONS 9-10: 0
HOW OFTEN DURING THE LAST YEAR HAVE YOU HAD A FEELING OF GUILT OR REMORSE AFTER DRINKING: LESS THAN MONTHLY
HAVE YOU EVER FELT YOU SHOULD CUT DOWN ON YOUR DRINKING: NO
HAVE YOU OR SOMEONE ELSE BEEN INJURED AS A RESULT OF YOUR DRINKING: NO
PRESCIPTION_ABUSE_PAST_12_MONTHS: YES
HOW OFTEN DURING THE LAST YEAR HAVE YOU BEEN UNABLE TO REMEMBER WHAT HAPPENED THE NIGHT BEFORE BECAUSE YOU HAD BEEN DRINKING: LESS THAN MONTHLY
EVER FELT BAD OR GUILTY ABOUT YOUR DRINKING: NO
HOW OFTEN DO YOU HAVE 6 OR MORE DRINKS ON ONE OCCASION: MONTHLY
EVER HAD A DRINK FIRST THING IN THE MORNING TO STEADY YOUR NERVES TO GET RID OF A HANGOVER: NO

## 2024-08-06 ASSESSMENT — PAIN SCALES - PAIN ASSESSMENT IN ADVANCED DEMENTIA (PAINAD)
BREATHING: NORMAL
CONSOLABILITY: NO NEED TO CONSOLE
TOTALSCORE: MEDICATION (SEE MAR)
BODYLANGUAGE: RELAXED
TOTALSCORE: 0
FACIALEXPRESSION: SMILING OR INEXPRESSIVE

## 2024-08-06 ASSESSMENT — ACTIVITIES OF DAILY LIVING (ADL)
DRESSING YOURSELF: INDEPENDENT
ADEQUATE_TO_COMPLETE_ADL: YES
JUDGMENT_ADEQUATE_SAFELY_COMPLETE_DAILY_ACTIVITIES: YES
FEEDING YOURSELF: INDEPENDENT
PATIENT'S MEMORY ADEQUATE TO SAFELY COMPLETE DAILY ACTIVITIES?: YES
HEARING - LEFT EAR: FUNCTIONAL
BATHING: INDEPENDENT
GROOMING: INDEPENDENT
HEARING - RIGHT EAR: FUNCTIONAL
TOILETING: INDEPENDENT
WALKS IN HOME: INDEPENDENT

## 2024-08-06 ASSESSMENT — PAIN DESCRIPTION - LOCATION
LOCATION: ABDOMEN
LOCATION: ABDOMEN

## 2024-08-06 ASSESSMENT — PAIN - FUNCTIONAL ASSESSMENT
PAIN_FUNCTIONAL_ASSESSMENT: 0-10
PAIN_FUNCTIONAL_ASSESSMENT: 0-10

## 2024-08-06 ASSESSMENT — PAIN DESCRIPTION - ORIENTATION
ORIENTATION: ANTERIOR;LOWER
ORIENTATION: MID

## 2024-08-06 ASSESSMENT — PAIN DESCRIPTION - DESCRIPTORS
DESCRIPTORS: DISCOMFORT
DESCRIPTORS: DISCOMFORT

## 2024-08-06 ASSESSMENT — PATIENT HEALTH QUESTIONNAIRE - PHQ9
2. FEELING DOWN, DEPRESSED OR HOPELESS: SEVERAL DAYS
SUM OF ALL RESPONSES TO PHQ9 QUESTIONS 1 & 2: 2
1. LITTLE INTEREST OR PLEASURE IN DOING THINGS: SEVERAL DAYS

## 2024-08-06 ASSESSMENT — PAIN SCALES - GENERAL
PAINLEVEL_OUTOF10: 8
PAINLEVEL_OUTOF10: 4
PAINLEVEL_OUTOF10: 5 - MODERATE PAIN

## 2024-08-06 ASSESSMENT — PAIN SCALES - WONG BAKER: WONGBAKER_NUMERICALRESPONSE: HURTS WHOLE LOT

## 2024-08-06 NOTE — ED PROVIDER NOTES
HPI   Chief Complaint   Patient presents with    Abdominal Pain       Patient presenting with right lower quadrant abdominal pain.  Patient states that last night around 9 PM he developed a dull pain over his lower abdomen, notes the pain is diffuse across his abdomen but seems to radiate more to the right lower quadrant.  Denies any movement of the pain since time of symptom onset.  Denies any inciting traumatic injury, denies any suspicious ingestions.  Notes mild nausea without any vomiting.  Denies any diarrhea.  Denies any fevers or chills.  Was evaluated in the ED yesterday and had a largely unremarkable evaluation and was discharged home, presents back today for ongoing symptoms.  Denies any previous abdominal surgeries.  Denies any dysuria, flank pain, hematuria.  Denies any radiation of the pain to his groin or scrotum.  Denies any other recent illness or injury.              Patient History   No past medical history on file.  No past surgical history on file.  No family history on file.  Social History     Tobacco Use    Smoking status: Never    Smokeless tobacco: Never   Substance Use Topics    Alcohol use: Yes     Comment: occasionally    Drug use: Never       Physical Exam   ED Triage Vitals [08/05/24 1955]   Temperature Heart Rate Respirations BP   36.8 °C (98.3 °F) 77 18 126/74      Pulse Ox Temp Source Heart Rate Source Patient Position   98 % Oral Monitor Sitting      BP Location FiO2 (%)     Left arm --       Physical Exam  Vitals and nursing note reviewed.   Constitutional:       General: He is not in acute distress.     Appearance: Normal appearance. He is not ill-appearing or toxic-appearing.   HENT:      Head: Normocephalic and atraumatic.      Nose: No congestion or rhinorrhea.      Mouth/Throat:      Mouth: Mucous membranes are moist.      Pharynx: Oropharynx is clear. No oropharyngeal exudate or posterior oropharyngeal erythema.   Eyes:      Extraocular Movements: Extraocular movements  intact.      Right eye: Normal extraocular motion.      Left eye: Normal extraocular motion.      Conjunctiva/sclera: Conjunctivae normal.      Pupils: Pupils are equal, round, and reactive to light.   Cardiovascular:      Rate and Rhythm: Normal rate and regular rhythm.      Pulses: Normal pulses.      Heart sounds: Normal heart sounds, S1 normal and S2 normal. No murmur heard.     No friction rub. No gallop.   Pulmonary:      Effort: Pulmonary effort is normal. No respiratory distress.      Breath sounds: Normal breath sounds. No stridor. No wheezing, rhonchi or rales.   Abdominal:      General: Abdomen is flat. Bowel sounds are normal. There is no distension.      Palpations: Abdomen is soft.      Tenderness: There is abdominal tenderness in the right lower quadrant, suprapubic area and left lower quadrant. There is no right CVA tenderness, left CVA tenderness, guarding or rebound. Positive signs include McBurney's sign. Negative signs include Castro's sign, Rovsing's sign, psoas sign and obturator sign.   Musculoskeletal:      Cervical back: Full passive range of motion without pain.      Right lower leg: No edema.      Left lower leg: No edema.   Skin:     General: Skin is warm and dry.   Neurological:      General: No focal deficit present.      Mental Status: He is alert and oriented to person, place, and time.      GCS: GCS eye subscore is 4. GCS verbal subscore is 5. GCS motor subscore is 6.      Cranial Nerves: No cranial nerve deficit.      Sensory: No sensory deficit.      Motor: No weakness, tremor or abnormal muscle tone.   Psychiatric:         Mood and Affect: Mood normal.           ED Course & MDM   Diagnoses as of 08/05/24 2220   Acute appendicitis, unspecified acute appendicitis type                       Tony Coma Scale Score: 15                        Medical Decision Making  Patient presenting with lower abdominal pain, does have tenderness diffusely across his abdomen although seems slightly  more pronounced at the right lower quadrant.  Consideration for diverticulitis, colitis, hepatobiliary pathology, appendicitis.  Denies any testicular pain or swelling to suggest  pathology.  Will assess with CT imaging, labs, will administer IV fluids/analgesics.      Evaluation remarkable for leukocytosis, CT scan showing evidence of acute uncomplicated appendicitis.  Serial examinations demonstrating normal range vital signs, no rebound/guarding to suggest madhu peritonitis.  Started on IV antibiotics, acute care surgery was consulted and recommended admission with plan for operative intervention in the morning.  Patient admitted in stable condition.        Procedure  Procedures     Miguel Morales MD  08/06/24 0119

## 2024-08-06 NOTE — PROGRESS NOTES
Pharmacy Medication History Review    Gene Rodriguez is a 19 y.o. male admitted for Acute appendicitis. Pharmacy reviewed the patient's [id number] medications and allergies for accuracy.    The list below reflects the updated PTA list. Comments regarding how patient may be taking medications differently can be found in the Admit Orders Activity  Prior to Admission Medications   Prescriptions Last Dose Informant   dicyclomine (Bentyl) 10 mg capsule 8/5/2024 Self   Sig: Take 1 capsule (10 mg) by mouth 4 times a day.   melatonin 10 mg tablet Past Week Self   Sig: Take 1 tablet (10 mg) by mouth as needed at bedtime.   methylphenidate (Ritalin) 10 mg tablet 8/4/2024 Self   Sig: Take 1 tablet (10 mg) by mouth once daily as needed.   methylphenidate ER 36 mg extended release tablet 8/4/2024 Self   Sig: Take 1 tablet (36 mg) by mouth once daily in the morning. Do not crush, chew, or split.      Facility-Administered Medications: None        The list below reflects the updated allergy list. Please review each documented allergy for additional clarification and justification.  Allergies  Reviewed by Ani Swain on 8/5/2024        Severity Reactions Comments    Aspartame Low Rash     Sorbitol Low Rash             Medications ADDED:  Melatonin 10 mg tablet   Medications CHANGED:  None  Medications REMOVED:   None    Patient accepts M2B at discharge. Pharmacy has been updated to [facility].    Sources used to complete the med history include :  [facility]RS- 06/20/2024 Methylphenidate 10 mg tablet 180 # / 90 days                     -06/20/2024 Methylphenidate ER 36 mg tablet 90 # / 90 days  Patient interview   Patient dispense [facility] Chart Review     Below are additional concerns with the patient's PTA list.  No concerns , pt is a reliable historian.     Ani Swain  Transitions of Care Pharmacy Tech [facility]  Please reach out via Secure Chat for questions, or if no  response call [phone] or [facility]

## 2024-08-06 NOTE — CARE PLAN
The patient's goals for the shift include      The clinical goals for the shift include Pt to remain safe and injury free    Problem: Pain  Goal: Takes deep breaths with improved pain control throughout the shift  Outcome: Met  Goal: Turns in bed with improved pain control throughout the shift  Outcome: Met  Goal: Walks with improved pain control throughout the shift  Outcome: Met  Goal: Performs ADL's with improved pain control throughout shift  Outcome: Met

## 2024-08-06 NOTE — HOSPITAL COURSE
18 yo male with PMH ADHD who presented with crampy periumbilical pain that he attributed to a stomachache. It then woke him from sleep at 1 and 3 AM prompting him to present to the emergency department.  Pain remained mostly periumbilical and lower suprapubic with increase in severity from 5 up toward 8/10.  He was evaluated in the ED this morning given Tylenol and Bentyl and then referred for same-day GI appointment.  His GI ordered a CT abdomen pelvis that showed findings of acute appendicitis prompting him to be referred back to the emergency department. ACS consulted and patient educated on need for OR but wanted to think about it. After re-evaluated patient decided to pursue non-op management wit antibiotics. Patient remained on Zosyn. His white count improved along with improving abdominal pain. Patient started on a diet for which he tolerated well without increasing abdominal pain, n/v. He was educated to return to the ED if having worsening abdominal pain, fever, chills, nausea or vomiting. He was discharged home on a 7 day course of Augmentin.

## 2024-08-06 NOTE — DISCHARGE SUMMARY
Discharge Diagnosis  Acute appendicitis    Issues Requiring Follow-Up  Acute Appendicitis, follow up with Dr. Jordan in 2 weeks    Test Results Pending At Discharge  Pending Labs       No current pending labs.            Hospital Course  18 yo male with PMH ADHD who presented with crampy periumbilical pain that he attributed to a stomachache. It then woke him from sleep at 1 and 3 AM prompting him to present to the emergency department.  Pain remained mostly periumbilical and lower suprapubic with increase in severity from 5 up toward 8/10.  He was evaluated in the ED this morning given Tylenol and Bentyl and then referred for same-day GI appointment.  His GI ordered a CT abdomen pelvis that showed findings of acute appendicitis prompting him to be referred back to the emergency department. ACS consulted and patient educated on need for OR but wanted to think about it. After re-evaluated patient decided to pursue non-op management wit antibiotics. Patient remained on Zosyn. His white count improved along with improving abdominal pain. Patient started on a diet for which he tolerated well without increasing abdominal pain, n/v. He was educated to return to the ED if having worsening abdominal pain, fever, chills, nausea or vomiting. He was discharged home on a 7 day course of Augmentin.     Pertinent Physical Exam At Time of Discharge  Physical Exam  Constitutional:       General: He is not in acute distress.  HENT:      Head: Atraumatic.   Cardiovascular:      Rate and Rhythm: Normal rate.   Pulmonary:      Effort: Pulmonary effort is normal. No respiratory distress.   Abdominal:      Palpations: Abdomen is soft.      Tenderness: There is abdominal tenderness (RLQ and below the umbilicus).   Musculoskeletal:         General: Normal range of motion.   Skin:     General: Skin is warm and dry.   Neurological:      Mental Status: He is alert and oriented to person, place, and time.       Home Medications      Medication List      START taking these medications     amoxicillin-pot clavulanate 875-125 mg tablet; Commonly known as:   Augmentin; Take 1 tablet by mouth 2 times a day for 7 days.     CONTINUE taking these medications     melatonin 10 mg tablet   * methylphenidate ER 36 mg extended release tablet   * methylphenidate 10 mg tablet; Commonly known as: Ritalin  * This list has 2 medication(s) that are the same as other medications   prescribed for you. Read the directions carefully, and ask your doctor or   other care provider to review them with you.     STOP taking these medications     dicyclomine 10 mg capsule; Commonly known as: Bentyl       Outpatient Follow-Up  Future Appointments   Date Time Provider Department Center   8/7/2024  8:30 AM Lissy Alfonso DO [id number]       Deandra Arroyo MD    Patient discussed with Dr. Julian Arroyo MD  PGY-1   Acute Care Surgery  Service Pager: [phone]

## 2024-08-06 NOTE — PROGRESS NOTES
Pharmacy Admission Order Reconciliation Review    Gene Rodriguez is a 19 y.o. male admitted for Acute appendicitis. Pharmacy reviewed the patient's unreconciled admission medications.    Prior to admission medications that were reviewed and acted on by the pharmacist include:  Melatonin   These medications have been reconciled.     Any other unreconcilied medications have been addressed and will be ordered or held by the patient's medical team. Medications addressed by the pharmacist may be added or changed by the patient's medical team at any time.    Erica Salas, PharmD  Transitions of Care Pharmacist  [facility]  Please reach out via Secure Chat for questions

## 2024-08-06 NOTE — CARE PLAN
The patient's goals for the shift include      The clinical goals for the shift include pain management     Over the shift, the patient did not make progress toward the following goals. Barriers to progression include recent diagnosis of acute appendicitis. Recommendations to address these barriers include possible surgical intervention or antibiotic therapy.

## 2024-08-06 NOTE — H&P
[facility]  ACUTE CARE SURGERY - HISTORY AND PHYSICAL / CONSULT    Patient Name: Gene Rodriguez  MRN: [id number]  Admit Date: 805  : 2005  AGE: 19 y.o.   GENDER: male  ==============================================================================  TODAY'S ASSESSMENT AND PLAN OF CARE:  20 y/o male with PMH of ADHD who presents with acute appendicitis. Patient would like to proceed with nonoperative management at this time.    Admit to ACS  NPO, IVF  Zosyn  Pain control with tylenol and oxycodone    ==============================================================================  CHIEF COMPLAINT/REASON FOR CONSULT:  Patient reports that yesterday evening he started having crampy periumbilical pain that he attributed to a stomachache.  It then woke him from sleep at 1 and 3 AM prompting him to present to the emergency department.  Pain remained mostly periumbilical and lower suprapubic with increase in severity from 5 up toward 8/10.  He was evaluated in the ED this morning given Tylenol and Bentyl and then referred for same-day GI appointment.  His GI ordered a CT abdomen pelvis that showed findings of acute appendicitis prompting him to be referred back to the emergency department.  Acute care surgery was consulted at that time for concern for acute appendicitis.  Patient denies fevers/chills, nausea/vomiting, diarrhea/constipation.  He denies prior abdominal surgery or smoking history.    PAST MEDICAL HISTORY:   PMH: ADHD    PSH: None  No past surgical history on file.  FH: Not applicable  No family history on file.  SOCIAL HISTORY:    Smoking: Denies  Social History     Tobacco Use   Smoking Status Never   Smokeless Tobacco Never       Alcohol: Occasional  Social History     Substance and Sexual Activity   Alcohol Use Yes    Comment: occasionally       Drug use: Denies    MEDICATIONS:   Prior to Admission medications    Medication Sig Start Date End Date Taking?  Authorizing Provider   dicyclomine (Bentyl) 10 mg capsule Take 1 capsule (10 mg) by mouth 4 times a day. 8/5/24 9/4/24  Rosalie Allen,    methylphenidate (Ritalin) 10 mg tablet Take 1 tablet (10 mg) by mouth if needed.    Historical Provider, MD   methylphenidate ER 36 mg extended release tablet Take 1 tablet (36 mg) by mouth once daily in the morning. Do not crush, chew, or split.    Historical Provider, MD   amphetamine-dextroamphetamine (Adderall) 10 mg tablet TAKE ONE TABLET BY MOUTH THREE TIMES A DAY (12 TABLET OF ADDERALL 10MG IS EQUIVALENT TO 1 TABLET OF RITALIN 10MG BUT IT LASTS 4-5 HOURS INS 8/16/23 8/5/24  Historical Provider, MD     ALLERGIES:  Allergies   Allergen Reactions    Aspartame Rash    Sorbitol Rash       REVIEW OF SYSTEMS:  12 systems reviewed and negative except as noted in the HPI.    PHYSICAL EXAM:  Physical Exam  Constitutional:       General: He is not in acute distress.  HENT:      Head: Normocephalic and atraumatic.   Eyes:      Extraocular Movements: Extraocular movements intact.      Pupils: Pupils are equal, round, and reactive to light.   Cardiovascular:      Rate and Rhythm: Normal rate.   Pulmonary:      Effort: Pulmonary effort is normal. No respiratory distress.   Abdominal:      General: There is no distension.      Palpations: Abdomen is soft.      Tenderness: There is abdominal tenderness.      Comments: Mild LLQ tenderness. Moderate RLQ tenderness to palpation. No rebound tenderness. Mild involuntary guarding of RLQ   Musculoskeletal:         General: Normal range of motion.      Cervical back: Normal range of motion.   Skin:     General: Skin is warm and dry.   Neurological:      General: No focal deficit present.      Mental Status: He is alert.   Psychiatric:         Mood and Affect: Mood normal.       IMAGING SUMMARY:  (summary of findings, not a copy of dictation)  CT abdomen/pelvis with IV contrast showing a dilated and inflamed appendix with nonopacification of  oral contrast and surrounding fat stranding consistent with acute appendicitis.    LABS:  Results from last 7 days   Lab Units 08/05/24  2016 08/05/24  0520   WBC AUTO x10*3/uL 12.0* 14.4*   HEMOGLOBIN g/dL 14.0 15.9   HEMATOCRIT % 39.5* 46.5   PLATELETS AUTO x10*3/uL 280 311   NEUTROS PCT AUTO % 80.2 80.8   LYMPHS PCT AUTO % 10.3 9.9   MONOS PCT AUTO % 8.1 7.4   EOS PCT AUTO % 0.8 1.1         Results from last 7 days   Lab Units 08/05/24  2016 08/05/24  0520   SODIUM mmol/L 137 138   POTASSIUM mmol/L 3.9 4.4   CHLORIDE mmol/L 102 101   CO2 mmol/L 25 28   BUN mg/dL 9 13   CREATININE mg/dL 0.81 0.89   CALCIUM mg/dL 8.5* 9.9   PROTEIN TOTAL g/dL  --  6.9   BILIRUBIN TOTAL mg/dL  --  0.9   ALK PHOS U/L  --  50   ALT U/L  --  11   AST U/L  --  15   GLUCOSE mg/dL 94 108*     Results from last 7 days   Lab Units 08/05/24  0520   BILIRUBIN TOTAL mg/dL 0.9             I have reviewed all laboratory and imaging results ordered/pertinent for this encounter.

## 2024-08-07 ENCOUNTER — OFFICE VISIT (OUTPATIENT)
Dept: PRIMARY CARE | Facility: CLINIC | Age: 19
End: 2024-08-07
Payer: COMMERCIAL

## 2024-08-07 VITALS
HEART RATE: 80 BPM | DIASTOLIC BLOOD PRESSURE: 82 MMHG | HEIGHT: 68 IN | WEIGHT: 166 LBS | BODY MASS INDEX: 25.16 KG/M2 | SYSTOLIC BLOOD PRESSURE: 124 MMHG

## 2024-08-07 DIAGNOSIS — R10.33 PERIUMBILICAL ABDOMINAL PAIN: ICD-10-CM

## 2024-08-07 DIAGNOSIS — K35.80 ACUTE APPENDICITIS, UNSPECIFIED ACUTE APPENDICITIS TYPE: Primary | ICD-10-CM

## 2024-08-07 DIAGNOSIS — Z79.899 MEDICATION MANAGEMENT: ICD-10-CM

## 2024-08-07 PROCEDURE — 3008F BODY MASS INDEX DOCD: CPT | Performed by: STUDENT IN AN ORGANIZED HEALTH CARE EDUCATION/TRAINING PROGRAM

## 2024-08-07 PROCEDURE — 99204 OFFICE O/P NEW MOD 45 MIN: CPT | Performed by: STUDENT IN AN ORGANIZED HEALTH CARE EDUCATION/TRAINING PROGRAM

## 2024-08-07 PROCEDURE — 1036F TOBACCO NON-USER: CPT | Performed by: STUDENT IN AN ORGANIZED HEALTH CARE EDUCATION/TRAINING PROGRAM

## 2024-08-07 NOTE — NURSING NOTE
Pt discharged to home with all of his belongings. His father is at the bedside to take the pt home. IVHL d/c'd, catheter intact. Bleeding controlled. AVS given and went over discharge instructions. Pt verb understanding. Refused transport or wheelchair. Walking with steady gait off floor.

## 2024-08-07 NOTE — PROGRESS NOTES
Gene Rodriguez is a 19 y.o. year old male presenting for Hospital Follow-up       HPI:  Patient presents to follow-up from his hospital admission for appendicitis.  He had originally woke him from sleep with lower abdominal pain causing him to go to the ED.  In the ED he was given Tylenol and Bentyl and was then referred for a same-day GI appointment.  When he went to go see GI they had ordered a stat CT which showed findings of acute appendicitis and referred him back to the emergency department.  In the emergency department he was evaluated and patient is noted to pursue nonsurgical treatment for appendicitis with antibiotics.  Patient was given 2 doses of Zosyn in the hospital and was then discharged on 7 days of Augmentin.  He was educated on returning to the ED if he continues to have worsening abdominal pain.      Patient has has not taken his first dose of antibiotics scheduled for this morning.  He does have questions, wishing to discuss this today.  Patient is also allergic to artificial sugar, which is commonly coated on pills.    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  He will be going back to his home in [place] tomorrow with his father.    Patient's past medical history and surgical history reviewed.  Patient does have a past medical history of ADHD for which he is on methylphenidate.  In addition allergies and medications reviewed.    ROS:   All pertinent positive symptoms are included in history of present illness.    All other systems have been reviewed and are negative and noncontributory to this patient's current ailments.    Current Outpatient Medications   Medication Sig Dispense Refill    amoxicillin-pot clavulanate (Augmentin) 875-125 mg tablet Take 1 tablet by mouth 2 times a day for 7 days. 14 tablet 0    melatonin 10 mg tablet Take 1 tablet (10 mg) by mouth as needed at bedtime.      methylphenidate (Ritalin) 10 mg tablet Take 1 tablet (10 mg)  "by mouth once daily as needed.      methylphenidate ER 36 mg extended release tablet Take 1 tablet (36 mg) by mouth once daily in the morning. Do not crush, chew, or split.       No current facility-administered medications for this visit.       OBJECTIVE  Visit Vitals  /82   Pulse 80   Ht 1.727 m (5' 8\")   Wt 75.3 kg (166 lb)   BMI 25.24 kg/m²   Smoking Status Never   BSA 1.9 m²        Physical Exam:  GENERAL: Alert, oriented, pleasant, in no acute distress  HEENT: Head normocephalic, atraumatic  CV: Heart with regular rate and rhythm, normal S1/S2, no murmurs  LUNGS: CTAB without wheezing, rhonchi or rales; good respiratory effort, no increased work of breathing  ABDOMEN: soft, tender in right lower quadrant as well as suprapubic area, non-distended, no masses appreciated; +BS in all four quadrants  EXTREMITIES: no edema, no cyanosis  PSYCH: Appropriate mood and affect  SKIN: No rashes or lesions appreciated    Assessment/Plan     Discussed patient's admission to the hospital with appendicitis today.  Answered all his questions about his medication interactions.  Advised patient of starting his antibiotics today.  He does have Bentyl as needed for pain as well as Tylenol as needed for pain.  In addition patient does have a stool softener as prescribed to him.   Discussed that if anytime he develops worsening pain to go to the nearest emergency room.  Patient aware of risk of perforation with appendicitis.    This patient was seen and staffed with Dr. Yuan.    Sparkle Marques DO  PGY 2  Family Medicine       "

## 2024-09-08 ENCOUNTER — ANESTHESIA EVENT (OUTPATIENT)
Dept: OPERATING ROOM | Facility: HOSPITAL | Age: 19
End: 2024-09-08
Payer: COMMERCIAL

## 2024-09-08 ENCOUNTER — APPOINTMENT (OUTPATIENT)
Dept: RADIOLOGY | Facility: HOSPITAL | Age: 19
End: 2024-09-08
Payer: COMMERCIAL

## 2024-09-08 ENCOUNTER — ANESTHESIA (OUTPATIENT)
Dept: OPERATING ROOM | Facility: HOSPITAL | Age: 19
End: 2024-09-08
Payer: COMMERCIAL

## 2024-09-08 ENCOUNTER — HOSPITAL ENCOUNTER (OUTPATIENT)
Facility: HOSPITAL | Age: 19
Setting detail: OBSERVATION
LOS: 1 days | Discharge: HOME | End: 2024-09-09
Attending: EMERGENCY MEDICINE | Admitting: SURGERY
Payer: COMMERCIAL

## 2024-09-08 DIAGNOSIS — R10.31 RIGHT LOWER QUADRANT ABDOMINAL PAIN: Primary | ICD-10-CM

## 2024-09-08 DIAGNOSIS — K35.80 ACUTE APPENDICITIS, UNSPECIFIED ACUTE APPENDICITIS TYPE: ICD-10-CM

## 2024-09-08 LAB
ABO GROUP (TYPE) IN BLOOD: NORMAL
ALBUMIN SERPL BCP-MCNC: 4.8 G/DL (ref 3.4–5)
ALP SERPL-CCNC: 52 U/L (ref 33–120)
ALT SERPL W P-5'-P-CCNC: 13 U/L (ref 10–52)
ANION GAP SERPL CALC-SCNC: 14 MMOL/L (ref 10–20)
ANTIBODY SCREEN: NORMAL
AST SERPL W P-5'-P-CCNC: 13 U/L (ref 9–39)
BASOPHILS # BLD AUTO: 0.04 X10*3/UL (ref 0–0.1)
BASOPHILS NFR BLD AUTO: 0.4 %
BILIRUB SERPL-MCNC: 1.1 MG/DL (ref 0–1.2)
BUN SERPL-MCNC: 9 MG/DL (ref 6–23)
CALCIUM SERPL-MCNC: 10.1 MG/DL (ref 8.6–10.6)
CHLORIDE SERPL-SCNC: 101 MMOL/L (ref 98–107)
CO2 SERPL-SCNC: 29 MMOL/L (ref 21–32)
CREAT SERPL-MCNC: 0.83 MG/DL (ref 0.5–1.3)
EGFRCR SERPLBLD CKD-EPI 2021: >90 ML/MIN/1.73M*2
EOSINOPHIL # BLD AUTO: 0.12 X10*3/UL (ref 0–0.7)
EOSINOPHIL NFR BLD AUTO: 1.2 %
ERYTHROCYTE [DISTWIDTH] IN BLOOD BY AUTOMATED COUNT: 12.8 % (ref 11.5–14.5)
GLUCOSE SERPL-MCNC: 74 MG/DL (ref 74–99)
HCT VFR BLD AUTO: 45.1 % (ref 41–52)
HGB BLD-MCNC: 15.2 G/DL (ref 13.5–17.5)
IMM GRANULOCYTES # BLD AUTO: 0.03 X10*3/UL (ref 0–0.7)
IMM GRANULOCYTES NFR BLD AUTO: 0.3 % (ref 0–0.9)
LIPASE SERPL-CCNC: 22 U/L (ref 9–82)
LYMPHOCYTES # BLD AUTO: 0.76 X10*3/UL (ref 1.2–4.8)
LYMPHOCYTES NFR BLD AUTO: 7.4 %
MCH RBC QN AUTO: 30 PG (ref 26–34)
MCHC RBC AUTO-ENTMCNC: 33.7 G/DL (ref 32–36)
MCV RBC AUTO: 89 FL (ref 80–100)
MONOCYTES # BLD AUTO: 0.89 X10*3/UL (ref 0.1–1)
MONOCYTES NFR BLD AUTO: 8.6 %
NEUTROPHILS # BLD AUTO: 8.45 X10*3/UL (ref 1.2–7.7)
NEUTROPHILS NFR BLD AUTO: 82.1 %
NRBC BLD-RTO: 0 /100 WBCS (ref 0–0)
PLATELET # BLD AUTO: 293 X10*3/UL (ref 150–450)
POTASSIUM SERPL-SCNC: 4.4 MMOL/L (ref 3.5–5.3)
PROT SERPL-MCNC: 7.4 G/DL (ref 6.4–8.2)
RBC # BLD AUTO: 5.07 X10*6/UL (ref 4.5–5.9)
RH FACTOR (ANTIGEN D): NORMAL
SODIUM SERPL-SCNC: 140 MMOL/L (ref 136–145)
WBC # BLD AUTO: 10.3 X10*3/UL (ref 4.4–11.3)

## 2024-09-08 PROCEDURE — 86901 BLOOD TYPING SEROLOGIC RH(D): CPT | Performed by: PHYSICIAN ASSISTANT

## 2024-09-08 PROCEDURE — 80053 COMPREHEN METABOLIC PANEL: CPT | Performed by: PHYSICIAN ASSISTANT

## 2024-09-08 PROCEDURE — 2500000004 HC RX 250 GENERAL PHARMACY W/ HCPCS (ALT 636 FOR OP/ED): Performed by: PHYSICIAN ASSISTANT

## 2024-09-08 PROCEDURE — 2500000005 HC RX 250 GENERAL PHARMACY W/O HCPCS: Performed by: SURGERY

## 2024-09-08 PROCEDURE — 36415 COLL VENOUS BLD VENIPUNCTURE: CPT | Performed by: PHYSICIAN ASSISTANT

## 2024-09-08 PROCEDURE — A44970 PR LAP,APPENDECTOMY: Performed by: ANESTHESIOLOGY

## 2024-09-08 PROCEDURE — 3700000002 HC GENERAL ANESTHESIA TIME - EACH INCREMENTAL 1 MINUTE: Performed by: SURGERY

## 2024-09-08 PROCEDURE — 2550000001 HC RX 255 CONTRASTS: Performed by: PHYSICIAN ASSISTANT

## 2024-09-08 PROCEDURE — 99221 1ST HOSP IP/OBS SF/LOW 40: CPT | Performed by: SURGERY

## 2024-09-08 PROCEDURE — 2720000007 HC OR 272 NO HCPCS: Performed by: SURGERY

## 2024-09-08 PROCEDURE — 74177 CT ABD & PELVIS W/CONTRAST: CPT | Mod: FOREIGN READ | Performed by: RADIOLOGY

## 2024-09-08 PROCEDURE — 74177 CT ABD & PELVIS W/CONTRAST: CPT

## 2024-09-08 PROCEDURE — 3600000004 HC OR TIME - INITIAL BASE CHARGE - PROCEDURE LEVEL FOUR: Performed by: SURGERY

## 2024-09-08 PROCEDURE — 99285 EMERGENCY DEPT VISIT HI MDM: CPT | Performed by: PHYSICIAN ASSISTANT

## 2024-09-08 PROCEDURE — 3600000009 HC OR TIME - EACH INCREMENTAL 1 MINUTE - PROCEDURE LEVEL FOUR: Performed by: SURGERY

## 2024-09-08 PROCEDURE — 96375 TX/PRO/DX INJ NEW DRUG ADDON: CPT | Mod: 59

## 2024-09-08 PROCEDURE — 7100000002 HC RECOVERY ROOM TIME - EACH INCREMENTAL 1 MINUTE: Performed by: SURGERY

## 2024-09-08 PROCEDURE — 2500000004 HC RX 250 GENERAL PHARMACY W/ HCPCS (ALT 636 FOR OP/ED)

## 2024-09-08 PROCEDURE — 83690 ASSAY OF LIPASE: CPT | Performed by: PHYSICIAN ASSISTANT

## 2024-09-08 PROCEDURE — 85025 COMPLETE CBC W/AUTO DIFF WBC: CPT | Performed by: PHYSICIAN ASSISTANT

## 2024-09-08 PROCEDURE — 96361 HYDRATE IV INFUSION ADD-ON: CPT | Mod: 59

## 2024-09-08 PROCEDURE — 99140 ANES COMP EMERGENCY COND: CPT | Performed by: ANESTHESIOLOGY

## 2024-09-08 PROCEDURE — 44970 LAPAROSCOPY APPENDECTOMY: CPT | Performed by: SURGERY

## 2024-09-08 PROCEDURE — 99285 EMERGENCY DEPT VISIT HI MDM: CPT | Mod: 25

## 2024-09-08 PROCEDURE — 88304 TISSUE EXAM BY PATHOLOGIST: CPT | Mod: TC,SUR | Performed by: SURGERY

## 2024-09-08 PROCEDURE — 96365 THER/PROPH/DIAG IV INF INIT: CPT | Mod: 59

## 2024-09-08 PROCEDURE — 7100000001 HC RECOVERY ROOM TIME - INITIAL BASE CHARGE: Performed by: SURGERY

## 2024-09-08 PROCEDURE — 2500000005 HC RX 250 GENERAL PHARMACY W/O HCPCS

## 2024-09-08 PROCEDURE — 88304 TISSUE EXAM BY PATHOLOGIST: CPT | Performed by: STUDENT IN AN ORGANIZED HEALTH CARE EDUCATION/TRAINING PROGRAM

## 2024-09-08 PROCEDURE — 3700000001 HC GENERAL ANESTHESIA TIME - INITIAL BASE CHARGE: Performed by: SURGERY

## 2024-09-08 RX ORDER — MIDAZOLAM HYDROCHLORIDE 1 MG/ML
INJECTION INTRAMUSCULAR; INTRAVENOUS AS NEEDED
Status: DISCONTINUED | OUTPATIENT
Start: 2024-09-08 | End: 2024-09-09

## 2024-09-08 RX ORDER — GLYCOPYRROLATE 0.2 MG/ML
INJECTION INTRAMUSCULAR; INTRAVENOUS AS NEEDED
Status: DISCONTINUED | OUTPATIENT
Start: 2024-09-08 | End: 2024-09-09

## 2024-09-08 RX ORDER — BUPIVACAINE HCL/EPINEPHRINE 0.5-1:200K
VIAL (ML) INJECTION AS NEEDED
Status: DISCONTINUED | OUTPATIENT
Start: 2024-09-08 | End: 2024-09-09 | Stop reason: HOSPADM

## 2024-09-08 RX ORDER — SODIUM CHLORIDE 0.9 G/100ML
INJECTION, SOLUTION IRRIGATION AS NEEDED
Status: DISCONTINUED | OUTPATIENT
Start: 2024-09-08 | End: 2024-09-09 | Stop reason: HOSPADM

## 2024-09-08 RX ORDER — SODIUM CHLORIDE, SODIUM LACTATE, POTASSIUM CHLORIDE, CALCIUM CHLORIDE 600; 310; 30; 20 MG/100ML; MG/100ML; MG/100ML; MG/100ML
100 INJECTION, SOLUTION INTRAVENOUS CONTINUOUS
Status: DISCONTINUED | OUTPATIENT
Start: 2024-09-09 | End: 2024-09-09 | Stop reason: HOSPADM

## 2024-09-08 RX ORDER — LIDOCAINE HYDROCHLORIDE 10 MG/ML
0.1 INJECTION, SOLUTION INFILTRATION; PERINEURAL ONCE
Status: DISCONTINUED | OUTPATIENT
Start: 2024-09-09 | End: 2024-09-09 | Stop reason: HOSPADM

## 2024-09-08 RX ORDER — OXYCODONE HYDROCHLORIDE 5 MG/1
2.5 TABLET ORAL EVERY 4 HOURS PRN
Status: DISCONTINUED | OUTPATIENT
Start: 2024-09-08 | End: 2024-09-09 | Stop reason: HOSPADM

## 2024-09-08 RX ORDER — LIDOCAINE HYDROCHLORIDE 20 MG/ML
INJECTION, SOLUTION INFILTRATION; PERINEURAL AS NEEDED
Status: DISCONTINUED | OUTPATIENT
Start: 2024-09-08 | End: 2024-09-09

## 2024-09-08 RX ORDER — HYDROMORPHONE HYDROCHLORIDE 1 MG/ML
0.5 INJECTION, SOLUTION INTRAMUSCULAR; INTRAVENOUS; SUBCUTANEOUS EVERY 5 MIN PRN
Status: DISCONTINUED | OUTPATIENT
Start: 2024-09-08 | End: 2024-09-09 | Stop reason: HOSPADM

## 2024-09-08 RX ORDER — HYDROMORPHONE HYDROCHLORIDE 1 MG/ML
INJECTION, SOLUTION INTRAMUSCULAR; INTRAVENOUS; SUBCUTANEOUS AS NEEDED
Status: DISCONTINUED | OUTPATIENT
Start: 2024-09-08 | End: 2024-09-09

## 2024-09-08 RX ORDER — OXYCODONE HYDROCHLORIDE 5 MG/1
10 TABLET ORAL EVERY 4 HOURS PRN
Status: DISCONTINUED | OUTPATIENT
Start: 2024-09-08 | End: 2024-09-09 | Stop reason: HOSPADM

## 2024-09-08 RX ORDER — SODIUM CHLORIDE, SODIUM LACTATE, POTASSIUM CHLORIDE, CALCIUM CHLORIDE 600; 310; 30; 20 MG/100ML; MG/100ML; MG/100ML; MG/100ML
75 INJECTION, SOLUTION INTRAVENOUS CONTINUOUS
Status: DISCONTINUED | OUTPATIENT
Start: 2024-09-08 | End: 2024-09-09

## 2024-09-08 RX ORDER — HYDROMORPHONE HYDROCHLORIDE 1 MG/ML
0.2 INJECTION, SOLUTION INTRAMUSCULAR; INTRAVENOUS; SUBCUTANEOUS EVERY 5 MIN PRN
Status: DISCONTINUED | OUTPATIENT
Start: 2024-09-08 | End: 2024-09-09 | Stop reason: HOSPADM

## 2024-09-08 RX ORDER — DEXMEDETOMIDINE IN 0.9 % NACL 20 MCG/5ML
SYRINGE (ML) INTRAVENOUS AS NEEDED
Status: DISCONTINUED | OUTPATIENT
Start: 2024-09-08 | End: 2024-09-09

## 2024-09-08 RX ORDER — DROPERIDOL 2.5 MG/ML
0.62 INJECTION, SOLUTION INTRAMUSCULAR; INTRAVENOUS ONCE AS NEEDED
Status: DISCONTINUED | OUTPATIENT
Start: 2024-09-08 | End: 2024-09-09 | Stop reason: HOSPADM

## 2024-09-08 RX ORDER — KETOROLAC TROMETHAMINE 30 MG/ML
30 INJECTION, SOLUTION INTRAMUSCULAR; INTRAVENOUS ONCE
Status: COMPLETED | OUTPATIENT
Start: 2024-09-08 | End: 2024-09-08

## 2024-09-08 RX ORDER — KETOROLAC TROMETHAMINE 30 MG/ML
INJECTION, SOLUTION INTRAMUSCULAR; INTRAVENOUS AS NEEDED
Status: DISCONTINUED | OUTPATIENT
Start: 2024-09-08 | End: 2024-09-09

## 2024-09-08 RX ORDER — PROPOFOL 10 MG/ML
INJECTION, EMULSION INTRAVENOUS AS NEEDED
Status: DISCONTINUED | OUTPATIENT
Start: 2024-09-08 | End: 2024-09-09

## 2024-09-08 RX ORDER — ONDANSETRON HYDROCHLORIDE 2 MG/ML
4 INJECTION, SOLUTION INTRAVENOUS ONCE AS NEEDED
Status: DISCONTINUED | OUTPATIENT
Start: 2024-09-08 | End: 2024-09-09 | Stop reason: HOSPADM

## 2024-09-08 RX ORDER — ACETAMINOPHEN 10 MG/ML
INJECTION, SOLUTION INTRAVENOUS AS NEEDED
Status: DISCONTINUED | OUTPATIENT
Start: 2024-09-08 | End: 2024-09-09

## 2024-09-08 RX ORDER — CEFAZOLIN 1 G/1
INJECTION, POWDER, FOR SOLUTION INTRAVENOUS AS NEEDED
Status: DISCONTINUED | OUTPATIENT
Start: 2024-09-08 | End: 2024-09-09

## 2024-09-08 RX ORDER — HYDROMORPHONE HYDROCHLORIDE 1 MG/ML
0.1 INJECTION, SOLUTION INTRAMUSCULAR; INTRAVENOUS; SUBCUTANEOUS EVERY 5 MIN PRN
Status: DISCONTINUED | OUTPATIENT
Start: 2024-09-08 | End: 2024-09-09 | Stop reason: HOSPADM

## 2024-09-08 RX ORDER — FENTANYL CITRATE 50 UG/ML
INJECTION, SOLUTION INTRAMUSCULAR; INTRAVENOUS AS NEEDED
Status: DISCONTINUED | OUTPATIENT
Start: 2024-09-08 | End: 2024-09-09

## 2024-09-08 RX ORDER — OXYCODONE HYDROCHLORIDE 5 MG/1
5 TABLET ORAL EVERY 4 HOURS PRN
Status: DISCONTINUED | OUTPATIENT
Start: 2024-09-08 | End: 2024-09-09 | Stop reason: HOSPADM

## 2024-09-08 RX ORDER — ROCURONIUM BROMIDE 10 MG/ML
INJECTION, SOLUTION INTRAVENOUS AS NEEDED
Status: DISCONTINUED | OUTPATIENT
Start: 2024-09-08 | End: 2024-09-09

## 2024-09-08 RX ADMIN — SODIUM CHLORIDE, POTASSIUM CHLORIDE, SODIUM LACTATE AND CALCIUM CHLORIDE 1000 ML: 600; 310; 30; 20 INJECTION, SOLUTION INTRAVENOUS at 14:20

## 2024-09-08 RX ADMIN — KETOROLAC TROMETHAMINE 30 MG: 30 INJECTION, SOLUTION INTRAMUSCULAR; INTRAVENOUS at 14:20

## 2024-09-08 RX ADMIN — IOHEXOL 75 ML: 350 INJECTION, SOLUTION INTRAVENOUS at 15:55

## 2024-09-08 RX ADMIN — SODIUM CHLORIDE, POTASSIUM CHLORIDE, SODIUM LACTATE AND CALCIUM CHLORIDE 75 ML/HR: 600; 310; 30; 20 INJECTION, SOLUTION INTRAVENOUS at 19:57

## 2024-09-08 RX ADMIN — PIPERACILLIN SODIUM AND TAZOBACTAM SODIUM 3.38 G: 3; .375 INJECTION, SOLUTION INTRAVENOUS at 19:21

## 2024-09-08 ASSESSMENT — PAIN DESCRIPTION - LOCATION: LOCATION: ABDOMEN

## 2024-09-08 ASSESSMENT — PAIN DESCRIPTION - PROGRESSION: CLINICAL_PROGRESSION: GRADUALLY IMPROVING

## 2024-09-08 ASSESSMENT — PAIN DESCRIPTION - PAIN TYPE: TYPE: ACUTE PAIN

## 2024-09-08 ASSESSMENT — COLUMBIA-SUICIDE SEVERITY RATING SCALE - C-SSRS
6. HAVE YOU EVER DONE ANYTHING, STARTED TO DO ANYTHING, OR PREPARED TO DO ANYTHING TO END YOUR LIFE?: NO
1. IN THE PAST MONTH, HAVE YOU WISHED YOU WERE DEAD OR WISHED YOU COULD GO TO SLEEP AND NOT WAKE UP?: NO
2. HAVE YOU ACTUALLY HAD ANY THOUGHTS OF KILLING YOURSELF?: NO

## 2024-09-08 ASSESSMENT — LIFESTYLE VARIABLES
HAVE YOU EVER FELT YOU SHOULD CUT DOWN ON YOUR DRINKING: NO
EVER HAD A DRINK FIRST THING IN THE MORNING TO STEADY YOUR NERVES TO GET RID OF A HANGOVER: NO
EVER FELT BAD OR GUILTY ABOUT YOUR DRINKING: NO
TOTAL SCORE: 0
HAVE PEOPLE ANNOYED YOU BY CRITICIZING YOUR DRINKING: NO

## 2024-09-08 ASSESSMENT — PAIN DESCRIPTION - ONSET: ONSET: ONGOING

## 2024-09-08 ASSESSMENT — PAIN DESCRIPTION - FREQUENCY: FREQUENCY: CONSTANT/CONTINUOUS

## 2024-09-08 ASSESSMENT — PAIN SCALES - GENERAL: PAINLEVEL_OUTOF10: 5 - MODERATE PAIN

## 2024-09-08 ASSESSMENT — PAIN - FUNCTIONAL ASSESSMENT: PAIN_FUNCTIONAL_ASSESSMENT: 0-10

## 2024-09-08 NOTE — LETTER
September 9, 2024     Patient: Gene Rodriguez   YOB: 2005   Date of Visit: 9/8/2024       To Whom it May Concern:    Gene Rodriguez was seen inpatient at [facility] from 9/8/2024 through 9/9/2024.   He underwent surgery during this admission.  He may return to work as early as feels comfortable but should be excused from school assessments this upcoming week due to ill opporunity for preparation. If you have any questions or concerns, please don't hesitate to call.         Sincerely,            Mignon Mckenna MD  General Surgery Resident  C: [phone]    CC: Lissy Alfonso DO

## 2024-09-08 NOTE — ED TRIAGE NOTES
Pt reports wth abd pain. Denies N/V/D/C, fever, chills. Was seen recently for appendicitis and opted for ATB vs surgical intervention. Reports today feeling the same pain.

## 2024-09-08 NOTE — H&P
[facility]  ACUTE CARE SURGERY - HISTORY AND PHYSICAL / CONSULT    Patient Name: Gene Rodriguez  MRN: [id number]  Admit Date: 908  : 2005  AGE: 19 y.o.   GENDER: male  ==============================================================================  TODAY'S ASSESSMENT AND PLAN OF CARE:  Gene Rodriguez is a 19 year old male with PMHx of ADHD who presented to ED with RLQ abdominal pain in setting of recent appendicitis. Patient had elected for non-operative management 24 and now having recurrence of symptoms. Patient otherwise feeling well besides abdominal pain without leukocytosis. Patient new imaging with thickening near ileocecal junction without clearly visualized appendix however exam consistent with acute appendicitis.     Plan:  -Admit to ACS   -OR for laparoscopic appendectomy, possible open   -Zosyn for abx   -NPO except sips with meds, MIVF     Seen and d/w Dr. Ketty Acuña MD  PGY-2 Gen Surg  ACS [id number]    ==============================================================================  CHIEF COMPLAINT/REASON FOR CONSULT:  Gene Rodriguez is a 19 year old male with PMHx of ADHD who presented to ED with RLQ abdominal pain in setting of recent appendicitis.     Patient was recently in hospital - after he had abdominal pain that woke him up from sleep. Patient at that time was found to have acute appendicitis. Patient at that time declined surgery and elected for trial of non-operative management. Patient was treated with a week of Augmentin and was discharged home with down-trending WBC and improving symptoms.     Today patient noted that he started to have worsening RLQ abdominal pain that he states is similar to his prior admission. States that it feels like it had initially felt prior to worsening. Denies any N/V/CP/SOB/Fevers/Chills. Patient states that he is now open to having surgery done if indicated.     PAST MEDICAL HISTORY:   PMH:    Past Medical History:   Diagnosis Date    ADHD 2019    Allergies        PSH:   Past Surgical History:   Procedure Laterality Date    WISDOM TOOTH EXTRACTION Bilateral 2021     FH:   No family history on file.  SOCIAL HISTORY:    Smoking:    Social History     Tobacco Use   Smoking Status Never   Smokeless Tobacco Never       Alcohol:    Social History     Substance and Sexual Activity   Alcohol Use Yes    Comment: occasionally       Drug use: Denies    MEDICATIONS:   Prior to Admission medications    Medication Sig Start Date End Date Taking? Authorizing Provider   melatonin 10 mg tablet Take 1 tablet (10 mg) by mouth as needed at bedtime.    Historical Provider, MD   methylphenidate (Ritalin) 10 mg tablet Take 1 tablet (10 mg) by mouth once daily as needed.    Historical Provider, MD   methylphenidate ER 36 mg extended release tablet Take 1 tablet (36 mg) by mouth once daily in the morning. Do not crush, chew, or split.    Historical Provider, MD     ALLERGIES:   Allergies   Allergen Reactions    Aspartame Rash    Sorbitol Rash       REVIEW OF SYSTEMS:  12 point ROS otherwise negative outside of HPI     PHYSICAL EXAM:  /74   Pulse 95   Temp 36.7 °C (98.1 °F)   Resp 18   Wt 74.8 kg (165 lb)   SpO2 97%   BMI 25.09 kg/m²   Constitutional: NAD, A&Ox3   Head/Neck: NCAT  Eyes: Anicteric   Cardiovascular: Regular rate and rhythm per peripheral palpation   Respiratory: Breathing comfortably on RA with symmetric chest rise   Abdominal: Soft, non-distended, tender to palpation in RLQ without rebound or guarding   : Deferred   Ext: MAEx4  Psych: Appropriate mood and affect     IMAGING SUMMARY:  (summary of findings, not a copy of dictation)  CT 9/8:   IMPRESSION:  1. No detectable acute abnormality.  2. Previously thickened appendix now normal caliber and difficult to  localize from adjacent small bowel.  3. Moderate stool burden.    LABS:  Results from last 7 days   Lab Units 09/08/24  1412   WBC AUTO  x10*3/uL 10.3   HEMOGLOBIN g/dL 15.2   HEMATOCRIT % 45.1   PLATELETS AUTO x10*3/uL 293   NEUTROS PCT AUTO % 82.1   LYMPHS PCT AUTO % 7.4   MONOS PCT AUTO % 8.6   EOS PCT AUTO % 1.2         Results from last 7 days   Lab Units 09/08/24  1412   SODIUM mmol/L 140   POTASSIUM mmol/L 4.4   CHLORIDE mmol/L 101   CO2 mmol/L 29   BUN mg/dL 9   CREATININE mg/dL 0.83   CALCIUM mg/dL 10.1   PROTEIN TOTAL g/dL 7.4   BILIRUBIN TOTAL mg/dL 1.1   ALK PHOS U/L 52   ALT U/L 13   AST U/L 13   GLUCOSE mg/dL 74     Results from last 7 days   Lab Units 09/08/24  1412   BILIRUBIN TOTAL mg/dL 1.1             I have reviewed all laboratory and imaging results ordered/pertinent for this encounter.

## 2024-09-08 NOTE — PROGRESS NOTES
Handoff received: 9/8/2024 1900 handoff care received from Abraham CAMACHO at this time. Please refer to her note for initial plan of care of this patient.     Patient signed out to me pending final ACS recommendations.  I agree with my prior providers assessment and plan of care.   See results review for workup.  I did not need to order any additional laboratory/radiologic studies or medications for the patient during my course of care.  ACS states that they will admit the patient to their service for operative management of his appendicitis, started him on Zosyn IV.  He will be kept n.p.o.  He remained hemodynamically stable throughout my ED course of care.  Findings and plan were discussed with patient and he was agreeable with plan and acknowledged understanding.  All questions and concerns were addressed.       This patient was staffed with ED Attending Dr. Thakkar to review the plan of care during ED course.     *Please note that portions of this note may have been completed with a voice recognition program.  Efforts were made to edit the dictations but occasionally, words are mis-transcribed.

## 2024-09-08 NOTE — ED PROVIDER NOTES
Emergency Department Provider Note        History of Present Illness     19-year-old male otherwise healthy presenting for abdominal pain.  On review of EMR and confirmed with the patient he was diagnosed with appendicitis 8/5, admitted for 2 days and received antibiotic therapy.  States after a course of antibiotics his symptoms resolved.  His pain returned last night and thought it was hunger pains.  Pain persisted and now has worsened this morning.  Points to his infra umbilicus area as the location of the pain.  Denies any nausea/vomiting, denies any fevers chills night sweats or rigors.  States he feels that his appendicitis has returned.    External Records Reviewed including ED notes, H&P, Discharge Summary, outpatient PCP/specialist notes.  Physical Exam     Triage Vitals: T 36.7 °C (98.1 °F)  HR 95  /74  RR 18  O2 97 %    GEN: NAD  EYES:  EOMs grossly intact, anicteric sclera  MINAL: Mucosa moist.  NECK: Supple.  CARD: RRR  PULMONARY: Moving air well. Clear all lung fields.  ABDOMEN: Soft, no guarding, no rigidity.  Tender periumbilical and right lower quadrant including McBurney point. NABS  EXTREMITIES: Full ROM, no pitting edema,   SKIN: Intact, warm and dry  NEURO: Alert and oriented x 3, speech is clear, no obvious deficits noted.         Medical Decision Making & ED Course     19-year-old male presenting for abdominal pain.  On exam he is well-appearing ambulating comfortably.  Vital signs stable including afebrile with no tachycardia hypoxia or hypotension.  ABD with tenderness periumbilical and right lower quadrant at McBurney point.  Will check laboratory work, provide fluids and Toradol, perform CT abdomen pelvis with concerns for return of his appendicitis after treated conservatively 1 month ago.    ED Course as of 09/08/24 1841   Sun Sep 08, 2024   1547 WBC: 10.3  Reassuring with no leukocytosis [RAKESH]   1547 Comprehensive metabolic panel  Normal electrolytes [RAKESH]   1745 CT abdomen pelvis  w IV contrast  CT read delayed due to being sent to outsourced radiology however radiology resident feels there may be a early/mild appendicitis.  I spoke with [facility] will see him bedside for evaluation.  Patient is updated and told to be n.p.o. [RAKESH]   1810 I called radiology observation about delay in report, they still have no update on the delay. [RAKESH]   1839 Signout given to Jonatan Gottlieb [RAKESH]      ED Course User Index  [RAKESH] Reed Bush PA-C         Diagnoses as of 09/08/24 1841   Right lower quadrant abdominal pain     CT abdomen pelvis w IV contrast    (Results Pending)     Labs Reviewed   CBC WITH AUTO DIFFERENTIAL - Abnormal       Result Value    WBC 10.3      nRBC 0.0      RBC 5.07      Hemoglobin 15.2      Hematocrit 45.1      MCV 89      MCH 30.0      MCHC 33.7      RDW 12.8      Platelets 293      Neutrophils % 82.1      Immature Granulocytes %, Automated 0.3      Lymphocytes % 7.4      Monocytes % 8.6      Eosinophils % 1.2      Basophils % 0.4      Neutrophils Absolute 8.45 (*)     Immature Granulocytes Absolute, Automated 0.03      Lymphocytes Absolute 0.76 (*)     Monocytes Absolute 0.89      Eosinophils Absolute 0.12      Basophils Absolute 0.04     COMPREHENSIVE METABOLIC PANEL - Normal    Glucose 74      Sodium 140      Potassium 4.4      Chloride 101      Bicarbonate 29      Anion Gap 14      Urea Nitrogen 9      Creatinine 0.83      eGFR >90      Calcium 10.1      Albumin 4.8      Alkaline Phosphatase 52      Total Protein 7.4      AST 13      Bilirubin, Total 1.1      ALT 13     LIPASE - Normal    Lipase 22      Narrative:     Venipuncture immediately after or during the administration of Metamizole may lead to falsely low results. Testing should be performed immediately prior to Metamizole dosing.   TYPE AND SCREEN    ABO TYPE A      Rh TYPE POS      ANTIBODY SCREEN NEG          ----------------------------------------------------------------------------------------------------------------------------    This note was dictated using a speech recognition program.  While an attempt was made at proof reading to minimize errors, minor errors in transcription may be present call for questions.     Reed Bush PA-C  09/08/24 7853

## 2024-09-09 VITALS
OXYGEN SATURATION: 97 % | WEIGHT: 165 LBS | DIASTOLIC BLOOD PRESSURE: 71 MMHG | RESPIRATION RATE: 18 BRPM | HEART RATE: 62 BPM | TEMPERATURE: 97.3 F | BODY MASS INDEX: 25.09 KG/M2 | SYSTOLIC BLOOD PRESSURE: 112 MMHG

## 2024-09-09 LAB
ALBUMIN SERPL BCP-MCNC: 4.3 G/DL (ref 3.4–5)
ANION GAP SERPL CALC-SCNC: 14 MMOL/L (ref 10–20)
BUN SERPL-MCNC: 9 MG/DL (ref 6–23)
CALCIUM SERPL-MCNC: 9.3 MG/DL (ref 8.6–10.6)
CHLORIDE SERPL-SCNC: 103 MMOL/L (ref 98–107)
CO2 SERPL-SCNC: 26 MMOL/L (ref 21–32)
CREAT SERPL-MCNC: 0.92 MG/DL (ref 0.5–1.3)
EGFRCR SERPLBLD CKD-EPI 2021: >90 ML/MIN/1.73M*2
ERYTHROCYTE [DISTWIDTH] IN BLOOD BY AUTOMATED COUNT: 12.9 % (ref 11.5–14.5)
GLUCOSE SERPL-MCNC: 148 MG/DL (ref 74–99)
HCT VFR BLD AUTO: 42 % (ref 41–52)
HGB BLD-MCNC: 13.5 G/DL (ref 13.5–17.5)
MAGNESIUM SERPL-MCNC: 2.27 MG/DL (ref 1.6–2.4)
MCH RBC QN AUTO: 29.7 PG (ref 26–34)
MCHC RBC AUTO-ENTMCNC: 32.1 G/DL (ref 32–36)
MCV RBC AUTO: 92 FL (ref 80–100)
NRBC BLD-RTO: 0 /100 WBCS (ref 0–0)
PHOSPHATE SERPL-MCNC: 3.7 MG/DL (ref 2.5–4.9)
PLATELET # BLD AUTO: 257 X10*3/UL (ref 150–450)
POTASSIUM SERPL-SCNC: 4.4 MMOL/L (ref 3.5–5.3)
RBC # BLD AUTO: 4.55 X10*6/UL (ref 4.5–5.9)
SODIUM SERPL-SCNC: 139 MMOL/L (ref 136–145)
WBC # BLD AUTO: 7.4 X10*3/UL (ref 4.4–11.3)

## 2024-09-09 PROCEDURE — 2500000004 HC RX 250 GENERAL PHARMACY W/ HCPCS (ALT 636 FOR OP/ED)

## 2024-09-09 PROCEDURE — 80069 RENAL FUNCTION PANEL: CPT

## 2024-09-09 PROCEDURE — 2500000001 HC RX 250 WO HCPCS SELF ADMINISTERED DRUGS (ALT 637 FOR MEDICARE OP): Performed by: STUDENT IN AN ORGANIZED HEALTH CARE EDUCATION/TRAINING PROGRAM

## 2024-09-09 PROCEDURE — G0378 HOSPITAL OBSERVATION PER HR: HCPCS

## 2024-09-09 PROCEDURE — 96361 HYDRATE IV INFUSION ADD-ON: CPT

## 2024-09-09 PROCEDURE — 96366 THER/PROPH/DIAG IV INF ADDON: CPT

## 2024-09-09 PROCEDURE — 83735 ASSAY OF MAGNESIUM: CPT

## 2024-09-09 PROCEDURE — 36415 COLL VENOUS BLD VENIPUNCTURE: CPT

## 2024-09-09 PROCEDURE — 2500000004 HC RX 250 GENERAL PHARMACY W/ HCPCS (ALT 636 FOR OP/ED): Performed by: STUDENT IN AN ORGANIZED HEALTH CARE EDUCATION/TRAINING PROGRAM

## 2024-09-09 PROCEDURE — 96376 TX/PRO/DX INJ SAME DRUG ADON: CPT

## 2024-09-09 PROCEDURE — 2500000005 HC RX 250 GENERAL PHARMACY W/O HCPCS

## 2024-09-09 PROCEDURE — 85027 COMPLETE CBC AUTOMATED: CPT

## 2024-09-09 RX ORDER — ACETAMINOPHEN 325 MG/1
650 TABLET ORAL EVERY 6 HOURS
Status: DISCONTINUED | OUTPATIENT
Start: 2024-09-09 | End: 2024-09-09 | Stop reason: HOSPADM

## 2024-09-09 RX ORDER — OXYCODONE HYDROCHLORIDE 5 MG/1
10 TABLET ORAL EVERY 4 HOURS PRN
Status: DISCONTINUED | OUTPATIENT
Start: 2024-09-09 | End: 2024-09-09 | Stop reason: HOSPADM

## 2024-09-09 RX ORDER — ACETAMINOPHEN 325 MG/1
650 TABLET ORAL EVERY 6 HOURS PRN
Qty: 40 TABLET | Refills: 0 | Status: SHIPPED | OUTPATIENT
Start: 2024-09-09 | End: 2024-09-19

## 2024-09-09 RX ORDER — ONDANSETRON 4 MG/1
4 TABLET, FILM COATED ORAL EVERY 8 HOURS PRN
Status: DISCONTINUED | OUTPATIENT
Start: 2024-09-09 | End: 2024-09-09 | Stop reason: HOSPADM

## 2024-09-09 RX ORDER — NALOXONE HYDROCHLORIDE 0.4 MG/ML
0.2 INJECTION, SOLUTION INTRAMUSCULAR; INTRAVENOUS; SUBCUTANEOUS EVERY 5 MIN PRN
Status: DISCONTINUED | OUTPATIENT
Start: 2024-09-09 | End: 2024-09-09

## 2024-09-09 RX ORDER — OXYCODONE HYDROCHLORIDE 5 MG/1
5 TABLET ORAL EVERY 6 HOURS PRN
Status: DISCONTINUED | OUTPATIENT
Start: 2024-09-09 | End: 2024-09-09

## 2024-09-09 RX ORDER — POLYETHYLENE GLYCOL 3350 17 G/17G
17 POWDER, FOR SOLUTION ORAL DAILY
Status: DISCONTINUED | OUTPATIENT
Start: 2024-09-09 | End: 2024-09-09 | Stop reason: HOSPADM

## 2024-09-09 RX ORDER — ONDANSETRON HYDROCHLORIDE 2 MG/ML
4 INJECTION, SOLUTION INTRAVENOUS EVERY 8 HOURS PRN
Status: DISCONTINUED | OUTPATIENT
Start: 2024-09-09 | End: 2024-09-09 | Stop reason: HOSPADM

## 2024-09-09 RX ORDER — OXYCODONE HYDROCHLORIDE 5 MG/1
10 TABLET ORAL EVERY 4 HOURS PRN
Status: DISCONTINUED | OUTPATIENT
Start: 2024-09-09 | End: 2024-09-09

## 2024-09-09 RX ORDER — METHOCARBAMOL 100 MG/ML
1000 INJECTION, SOLUTION INTRAMUSCULAR; INTRAVENOUS ONCE
Status: COMPLETED | OUTPATIENT
Start: 2024-09-09 | End: 2024-09-09

## 2024-09-09 RX ORDER — OXYCODONE HYDROCHLORIDE 5 MG/1
5 TABLET ORAL EVERY 6 HOURS PRN
Status: DISCONTINUED | OUTPATIENT
Start: 2024-09-09 | End: 2024-09-09 | Stop reason: HOSPADM

## 2024-09-09 RX ORDER — ONDANSETRON 4 MG/1
4 TABLET, ORALLY DISINTEGRATING ORAL EVERY 8 HOURS PRN
Status: DISCONTINUED | OUTPATIENT
Start: 2024-09-09 | End: 2024-09-09 | Stop reason: HOSPADM

## 2024-09-09 RX ORDER — HEPARIN SODIUM 5000 [USP'U]/ML
5000 INJECTION, SOLUTION INTRAVENOUS; SUBCUTANEOUS EVERY 8 HOURS
Status: DISCONTINUED | OUTPATIENT
Start: 2024-09-09 | End: 2024-09-09 | Stop reason: SDUPTHER

## 2024-09-09 RX ORDER — ENOXAPARIN SODIUM 100 MG/ML
40 INJECTION SUBCUTANEOUS EVERY 24 HOURS
Status: DISCONTINUED | OUTPATIENT
Start: 2024-09-09 | End: 2024-09-09 | Stop reason: HOSPADM

## 2024-09-09 RX ORDER — KETOROLAC TROMETHAMINE 15 MG/ML
15 INJECTION, SOLUTION INTRAMUSCULAR; INTRAVENOUS 4 TIMES DAILY
Status: DISCONTINUED | OUTPATIENT
Start: 2024-09-09 | End: 2024-09-09 | Stop reason: HOSPADM

## 2024-09-09 RX ORDER — NALOXONE HYDROCHLORIDE 0.4 MG/ML
0.2 INJECTION, SOLUTION INTRAMUSCULAR; INTRAVENOUS; SUBCUTANEOUS EVERY 5 MIN PRN
Status: DISCONTINUED | OUTPATIENT
Start: 2024-09-09 | End: 2024-09-09 | Stop reason: HOSPADM

## 2024-09-09 RX ORDER — ACETAMINOPHEN 500 MG
10 TABLET ORAL NIGHTLY PRN
Status: DISCONTINUED | OUTPATIENT
Start: 2024-09-09 | End: 2024-09-09 | Stop reason: HOSPADM

## 2024-09-09 RX ADMIN — METHOCARBAMOL 1000 MG: 100 INJECTION INTRAMUSCULAR; INTRAVENOUS at 00:38

## 2024-09-09 RX ADMIN — OXYCODONE HYDROCHLORIDE 5 MG: 5 TABLET ORAL at 08:20

## 2024-09-09 RX ADMIN — SODIUM CHLORIDE, POTASSIUM CHLORIDE, SODIUM LACTATE AND CALCIUM CHLORIDE 75 ML/HR: 600; 310; 30; 20 INJECTION, SOLUTION INTRAVENOUS at 02:58

## 2024-09-09 RX ADMIN — HYDROMORPHONE HYDROCHLORIDE 0.5 MG: 1 INJECTION, SOLUTION INTRAMUSCULAR; INTRAVENOUS; SUBCUTANEOUS at 00:50

## 2024-09-09 RX ADMIN — KETOROLAC TROMETHAMINE 15 MG: 15 INJECTION, SOLUTION INTRAMUSCULAR; INTRAVENOUS at 06:17

## 2024-09-09 RX ADMIN — ACETAMINOPHEN 650 MG: 325 TABLET ORAL at 03:16

## 2024-09-09 RX ADMIN — Medication 2 L/MIN: at 00:15

## 2024-09-09 RX ADMIN — ACETAMINOPHEN 650 MG: 325 TABLET ORAL at 08:20

## 2024-09-09 RX ADMIN — PIPERACILLIN SODIUM AND TAZOBACTAM SODIUM 3.38 G: 3; .375 INJECTION, SOLUTION INTRAVENOUS at 06:17

## 2024-09-09 SDOH — SOCIAL STABILITY: SOCIAL INSECURITY: HAS ANYONE EVER THREATENED TO HURT YOUR FAMILY OR YOUR PETS?: NO

## 2024-09-09 SDOH — SOCIAL STABILITY: SOCIAL INSECURITY: DO YOU FEEL UNSAFE GOING BACK TO THE PLACE WHERE YOU ARE LIVING?: NO

## 2024-09-09 SDOH — SOCIAL STABILITY: SOCIAL INSECURITY: HAVE YOU HAD ANY THOUGHTS OF HARMING ANYONE ELSE?: NO

## 2024-09-09 SDOH — SOCIAL STABILITY: SOCIAL INSECURITY: DOES ANYONE TRY TO KEEP YOU FROM HAVING/CONTACTING OTHER FRIENDS OR DOING THINGS OUTSIDE YOUR HOME?: NO

## 2024-09-09 SDOH — SOCIAL STABILITY: SOCIAL INSECURITY: HAVE YOU HAD THOUGHTS OF HARMING ANYONE ELSE?: NO

## 2024-09-09 SDOH — SOCIAL STABILITY: SOCIAL INSECURITY: ARE THERE ANY APPARENT SIGNS OF INJURIES/BEHAVIORS THAT COULD BE RELATED TO ABUSE/NEGLECT?: NO

## 2024-09-09 SDOH — SOCIAL STABILITY: SOCIAL INSECURITY: DO YOU FEEL ANYONE HAS EXPLOITED OR TAKEN ADVANTAGE OF YOU FINANCIALLY OR OF YOUR PERSONAL PROPERTY?: NO

## 2024-09-09 SDOH — SOCIAL STABILITY: SOCIAL INSECURITY: ARE YOU OR HAVE YOU BEEN THREATENED OR ABUSED PHYSICALLY, EMOTIONALLY, OR SEXUALLY BY ANYONE?: NO

## 2024-09-09 SDOH — SOCIAL STABILITY: SOCIAL INSECURITY: ABUSE: ADULT

## 2024-09-09 ASSESSMENT — PATIENT HEALTH QUESTIONNAIRE - PHQ9
SUM OF ALL RESPONSES TO PHQ9 QUESTIONS 1 & 2: 2
2. FEELING DOWN, DEPRESSED OR HOPELESS: SEVERAL DAYS
1. LITTLE INTEREST OR PLEASURE IN DOING THINGS: SEVERAL DAYS

## 2024-09-09 ASSESSMENT — LIFESTYLE VARIABLES
AUDIT-C TOTAL SCORE: 5
AUDIT TOTAL SCORE: 0
SUBSTANCE_ABUSE_PAST_12_MONTHS: NO
HOW OFTEN DO YOU HAVE 6 OR MORE DRINKS ON ONE OCCASION: MONTHLY
HOW MANY STANDARD DRINKS CONTAINING ALCOHOL DO YOU HAVE ON A TYPICAL DAY: 1 OR 2
HOW OFTEN DURING THE LAST YEAR HAVE YOU HAD A FEELING OF GUILT OR REMORSE AFTER DRINKING: NEVER
SKIP TO QUESTIONS 9-10: 0
HOW OFTEN DO YOU HAVE A DRINK CONTAINING ALCOHOL: 2-3 TIMES A WEEK
HOW OFTEN DURING THE LAST YEAR HAVE YOU BEEN UNABLE TO REMEMBER WHAT HAPPENED THE NIGHT BEFORE BECAUSE YOU HAD BEEN DRINKING: NEVER
HAS A RELATIVE, FRIEND, DOCTOR, OR ANOTHER HEALTH PROFESSIONAL EXPRESSED CONCERN ABOUT YOUR DRINKING OR SUGGESTED YOU CUT DOWN: NO
HOW OFTEN DURING THE LAST YEAR HAVE YOU FOUND THAT YOU WERE NOT ABLE TO STOP DRINKING ONCE YOU HAD STARTED: NEVER
HOW OFTEN DURING THE LAST YEAR HAVE YOU NEEDED AN ALCOHOLIC DRINK FIRST THING IN THE MORNING TO GET YOURSELF GOING AFTER A NIGHT OF HEAVY DRINKING: NEVER
HOW OFTEN DURING THE LAST YEAR HAVE YOU FAILED TO DO WHAT WAS NORMALLY EXPECTED FROM YOU BECAUSE OF DRINKING: NEVER
PRESCIPTION_ABUSE_PAST_12_MONTHS: NO
HAVE YOU OR SOMEONE ELSE BEEN INJURED AS A RESULT OF YOUR DRINKING: NO
AUDIT TOTAL SCORE: 5
AUDIT-C TOTAL SCORE: 5

## 2024-09-09 ASSESSMENT — ACTIVITIES OF DAILY LIVING (ADL)
PATIENT'S MEMORY ADEQUATE TO SAFELY COMPLETE DAILY ACTIVITIES?: YES
BATHING: INDEPENDENT
GROOMING: INDEPENDENT
HEARING - LEFT EAR: FUNCTIONAL
LACK_OF_TRANSPORTATION: NO
HEARING - RIGHT EAR: FUNCTIONAL
TOILETING: INDEPENDENT
FEEDING YOURSELF: INDEPENDENT
JUDGMENT_ADEQUATE_SAFELY_COMPLETE_DAILY_ACTIVITIES: YES
ADEQUATE_TO_COMPLETE_ADL: YES
WALKS IN HOME: INDEPENDENT
DRESSING YOURSELF: INDEPENDENT

## 2024-09-09 ASSESSMENT — PAIN SCALES - GENERAL
PAINLEVEL_OUTOF10: 6
PAINLEVEL_OUTOF10: 4
PAINLEVEL_OUTOF10: 4
PAINLEVEL_OUTOF10: 6
PAINLEVEL_OUTOF10: 2
PAIN_LEVEL: 0
PAINLEVEL_OUTOF10: 4
PAINLEVEL_OUTOF10: 3
PAINLEVEL_OUTOF10: 6
PAINLEVEL_OUTOF10: 7

## 2024-09-09 ASSESSMENT — COGNITIVE AND FUNCTIONAL STATUS - GENERAL
MOBILITY SCORE: 24
DAILY ACTIVITIY SCORE: 24
PATIENT BASELINE BEDBOUND: NO

## 2024-09-09 ASSESSMENT — PAIN - FUNCTIONAL ASSESSMENT
PAIN_FUNCTIONAL_ASSESSMENT: 0-10
PAIN_FUNCTIONAL_ASSESSMENT: 0-10

## 2024-09-09 ASSESSMENT — PAIN DESCRIPTION - DESCRIPTORS: DESCRIPTORS: DISCOMFORT;ACHING

## 2024-09-09 NOTE — OP NOTE
Appendectomy Laparoscopy Operative Note     Date: 2024  OR Location: [facility] OR    Name: Gene Rodriguez, : 2005, Age: 19 y.o., MRN: [id number], Sex: male    Diagnosis  Pre-op Diagnosis      * Acute appendicitis, unspecified acute appendicitis type [K35.80] Post-op Diagnosis     * Acute appendicitis, unspecified acute appendicitis type [K35.80]     Procedures  Appendectomy Laparoscopy  34193 - [place] LAPAROSCOPIC APPENDECTOMY      Surgeons      * Britney Hdez - Primary    Resident/Fellow/Other Assistant:  Surgeons and Role:     * William Acuña MD - Resident - Assisting     * Dominique Crabtree MD - Resident - Assisting    Procedure Summary  Anesthesia: Anesthesia type not filed in the log.  ASA: ASA status not filed in the log.  Anesthesia Staff: Anesthesiologist: Richar Marion DO  Anesthesia Resident: Yaw Flores MD  Estimated Blood Loss: 2mL             Anesthesia Record               Intraprocedure I/O Totals          Output    Urine 175 mL    Total Output 175 mL          Specimen:   ID Type Source Tests Collected by Time   1 : APPENDIX Tissue APPENDIX SURGICAL PATHOLOGY EXAM Britney Hdez MD 2024 1875        Staff:   Circulator: Woody  Scrub Person: Kina         Drains and/or Catheters:   [REMOVED] Urethral Catheter Non-latex 16 Fr. (Removed)     Tourniquet Times: none      Implants: none    Findings: Inflamed appendix without perforation. No free fluid in the pelvis.     Indications: Gene Rodriguez is an 19 y.o. male who is having surgery for Acute appendicitis, unspecified acute appendicitis type [K35.80]. Patient was originally seen on  by our service for acute, uncomplicated  appendicitis, and elected for non-operative management with antibiotics. He returned to the ED with similar symptoms for the past 2 days, now without leukocytosis, but with a CT scan that shows thickening and inflammation near the ileocecal function, without obvious free fluid or abscess. Patient was  amenable to surgical intervention during this admission.    The patient was seen in the preoperative area. The risks, benefits, complications, treatment options, non-operative alternatives, expected recovery and outcomes were discussed with the patient. The possibilities of reaction to medication, pulmonary aspiration, injury to surrounding structures, bleeding, recurrent infection, the need for additional procedures, failure to diagnose a condition, and creating a complication requiring transfusion or operation were discussed with the patient. The patient concurred with the proposed plan, giving informed consent.  The site of surgery was properly noted/marked if necessary per policy. The patient has been actively warmed in preoperative area. Preoperative antibiotics have been ordered and given within 1 hours of incision. Venous thrombosis prophylaxis have been ordered including bilateral sequential compression devices    Procedure Details:   Patient was taken to the OR and an initial timeout was performed. He was placed in the supine position with his left arm tucked and right arm out. Patient was prepped and draped in the usual sterile fashion. Local anesthetic was injected into the skin and underlying subcutaneous tissues just inferior to the umbilicus. An infraumbilical Flores cutdown was completed. Once entry into the peritoneal cavity was confirmed, a 12mm Flores trocar  was inserted into the abdominal cavity. CO2 was instilled to an intra-abdominal pressure of 15mmHg in order to obtain adequate pneumoperitoneum. After confirming that no injuries were sustained on entry into the abdominal cavity, the viscera were inspected. No free peritoneal fluid was seen, and the appendix was identified just anterior to the cecum. It was inflamed and erythematous, but not perforated. Two additional 5mm trocars were placed under direct visualization: one in the left lower quadrant and another in the midline just superior to  the pubis. The small bowel was retracted to allow for direct visualization of the cecum and base of the appendix. Using a [place] dissector, a window was created between the base of the appendix and the mesoappendix. An Endo-NAHOMI stapler with a 45mm white load was introduced and passed through the previously created window. The appendix was divided at its base. Using a LigaSure, the mesoappendix was subsequently divided. The appendix was placed in an Endo Catch pouch and removed from the abdomen through the 12mm port site. The staple line was inspected, and no bleeding was appreciated. The 5mm ports were removed under direct visualization; there was no bleeding from either site. The 12mm port was removed, pneumoperitoneum was evacuated, and the fascia was closed with a figure-of-eight 0-Vicryl suture and a single simple interrupted 0-Vicryl suture. All skin incisions were closed with 4-0 monocryl. Skin glue was placed over each incision.    All sponge and instrument counts were correct at the end of the procedure. There were no complications. Patient tolerated the procedure well, and went to PACU in stable condition.     Complications:  None; patient tolerated the procedure well.    Disposition: PACU - hemodynamically stable.  Condition: stable     Additional Details: Likely discharge tomorrow AM after breakfast    Dominique Crabtree MD, MSc  Acute Care Surgery  Pager [phone]    Attending Attestation: I was present and scrubbed for the entire procedure.    Britney Hdez  Phone Number: [phone]

## 2024-09-09 NOTE — DISCHARGE SUMMARY
Discharge Diagnosis  Acute appendicitis, unspecified acute appendicitis type    Issues Requiring Follow-Up  Post-operative follow-up can be done virtually or in-person    Test Results Pending At Discharge  Pending Labs       Order Current Status    Surgical Pathology Exam In process            Hospital Course   Mr. Gene Rodriguez is a 20 yo M who presented with RLQ pain and failure of medical management for acute appendicitis. He underwent  laparoscopic appendectomy on 9/8 overnight with Dr. Hdez.. Patient doing well postoperatively without acute concerns. Pain is well controlled. Denies chest pain, shortness of breath, fevers, chills, nausea, vomiting.     Pertinent Physical Exam At Time of Discharge      9/9/2024     1:30 AM 9/9/2024     1:45 AM 9/9/2024     2:00 AM 9/9/2024     2:15 AM 9/9/2024     3:16 AM 9/9/2024     5:00 AM 9/9/2024     8:34 AM   Vitals   Systolic 108 110 115 112 114 110 112   Diastolic 59 60 59 59 73 62 71   Heart Rate 74 71 77 75 72 82 62   Temp    36.2 °C (97.2 °F) 36.5 °C (97.7 °F) 36.3 °C (97.3 °F) 36.3 °C (97.3 °F)   Resp 14 12 14 14 16 16 18       General: well-apprearing, NAD, Aox3, conversive, does appear anxious about having to return back to school  HEENT:  no scleral icterus, PERRL, iEOM, MMM  Cardiovascular: RRR, capillary refill <3sec, 2+ palpable radial, femoral , DP/PT bilaterally  Pulmonary: breathing comfortably on room air  Abdomen: soft, appropriate tenderness to palpation surrounding incision sites, nondistended, nonperitonitic  Surgical Site: Dressing clean dry and intact with minimal amounts of strikethrough, appropriately tender  Skin: warm and dry  Neuro: A/O x3, no focal deficits  Psych: normal mood and behavior    Home Medications     Medication List      START taking these medications     acetaminophen 325 mg tablet; Commonly known as: Tylenol; Take 2 tablets   (650 mg) by mouth every 6 hours if needed for mild pain (1 - 3) for up to   10 days.     CONTINUE  taking these medications     melatonin 10 mg tablet   * methylphenidate ER 36 mg extended release tablet   * methylphenidate 10 mg tablet; Commonly known as: Ritalin  * This list has 2 medication(s) that are the same as other medications   prescribed for you. Read the directions carefully, and ask your doctor or   other care provider to review them with you.       Outpatient Follow-Up  No future appointments.  Referral for trauma surgery clinic follow-up    Mignon Mckenna MD  PGY1 Acute Care Surgery  Pager [phone]  Available via secure chat

## 2024-09-09 NOTE — ANESTHESIA PROCEDURE NOTES
Airway  Date/Time: 9/8/2024 10:48 PM  Urgency: elective    Airway not difficult    Staffing  Performed: resident   Authorized by: Richar Marion DO    Performed by: Yaw Flores MD  Patient location during procedure: OR    Indications and Patient Condition  Indications for airway management: anesthesia and airway protection  Spontaneous ventilation: present  Preoxygenated: yes  Patient position: sniffing  Mask difficulty assessment: 1 - vent by mask  Planned trial extubation    Final Airway Details  Final airway type: endotracheal airway      Successful airway: ETT  Cuffed: yes   Successful intubation technique: direct laryngoscopy  Facilitating devices/methods: intubating stylet  Blade: Harsha  Blade size: #4  ETT size (mm): 7.5  Cormack-Lehane Classification: grade IIa - partial view of glottis  Placement verified by: capnometry   Measured from: lips  ETT to lips (cm): 23  Number of attempts at approach: 1

## 2024-09-09 NOTE — SIGNIFICANT EVENT
POST OP CHECK    S:    POD 0 from laparoscopic appendectomy. Patient doing well postoperatively without acute concerns. Pain is well controlled. Denies chest pain, shortness of breath, fevers, chills, nausea, vomiting.    O:   Visit Vitals  /73   Pulse 72   Temp 36.5 °C (97.7 °F)   Resp 16      PHYSICAL EXAM  Constitutional: walking around room, no acute distress   Skin: warm and dry  Neuro: alert and conversant   Cardiac: non-cyanotic   Pulmonary: non-labored breathing on room air   Abdomen: soft, appropriately tender to palpation near incisions, non-distended  Surgical Site: incisions c/d/I covered with dermabond     A/P:   - patient doing well overall postoperatively with no acute concerns   - will continue to optimize pain control   - will follow up with the patient in the AM or sooner as needed    Deandra Arroyo MD  PGY-1   Acute Care Surgery  Service Pager: [phone]

## 2024-09-09 NOTE — PROGRESS NOTES
Pharmacy Medication History Review    Gene Rodriguez is a 19 y.o. male admitted for Acute appendicitis, unspecified acute appendicitis type. Pharmacy reviewed the patient's [id number] medications and allergies for accuracy.    The list below reflects the updated PTA list.   Prior to Admission Medications   Prescriptions Last Dose Informant Patient Reported?   melatonin 10 mg tablet  Self Yes   Sig: Take 1 tablet (10 mg) by mouth as needed at bedtime.   methylphenidate (Ritalin) 10 mg tablet  Self Yes   Sig: Take 1 tablet (10 mg) by mouth once daily as needed.   methylphenidate ER 36 mg extended release tablet  Self Yes   Sig: Take 1 tablet (36 mg) by mouth once daily in the morning. Do not crush, chew, or split.      Facility-Administered Medications: None        The list below reflects the updated allergy list. Please review each documented allergy for additional clarification and justification.  Allergies  Reviewed by Kayley Blackman, JoelD on 9/9/2024        Severity Reactions Comments    Aspartame Low Rash     Sorbitol Low Rash             Patient accepts M2B at discharge. Pharmacy has been updated to [facility].    Sources used to complete the med history include: Patient interview - fair historian of medications/Pharmacy - [facility], [facility]/ Chart review - [facility] care visit 8/7/24/ ADRIANE Blackman PharmD  Transitions of Care Pharmacist  [facility]  Please reach out via Secure Chat for questions, or if no response call [facility] or [facility]

## 2024-09-09 NOTE — CARE PLAN
Problem: Pain  Goal: Takes deep breaths with improved pain control throughout the shift  Outcome: Progressing  Goal: Turns in bed with improved pain control throughout the shift  Outcome: Progressing  Goal: Walks with improved pain control throughout the shift  Outcome: Progressing  Goal: Performs ADL's with improved pain control throughout shift  Outcome: Progressing  Goal: Participates in PT with improved pain control throughout the shift  Outcome: Progressing  Goal: Free from opioid side effects throughout the shift  Outcome: Progressing  Goal: Free from acute confusion related to pain meds throughout the shift  Outcome: Progressing   The patient's goals for the shift include      The clinical goals for the shift include Pt will remain HDS this shift

## 2024-09-09 NOTE — BRIEF OP NOTE
Date: 2024  OR Location: [facility] OR    Name: Gene Rodriguez, : 2005, Age: 19 y.o., MRN: [id number], Sex: male    Diagnosis  Pre-op Diagnosis      * Acute appendicitis, unspecified acute appendicitis type [K35.80] Post-op Diagnosis     * Acute appendicitis, unspecified acute appendicitis type [K35.80]     Procedures  Appendectomy Laparoscopy  17858 - [place] LAPAROSCOPIC APPENDECTOMY      Surgeons      * Britney Hdez - Primary    Resident/Fellow/Other Assistant:  Surgeons and Role:     * William Acuña MD - Resident - Assisting     * Dominique Crabtree MD - Resident - Assisting    Procedure Summary  Anesthesia: Anesthesia type not filed in the log.  ASA: ASA status not filed in the log.  Anesthesia Staff: Anesthesiologist: Richar Marion DO  Anesthesia Resident: Yaw Flores MD  Estimated Blood Loss: 2mL  Intra-op Medications: * Intraprocedure medication information is unavailable because the case start and end events have not been set *           Anesthesia Record               Intraprocedure I/O Totals          Output    Urine 175 mL    Total Output 175 mL          Specimen:   ID Type Source Tests Collected by Time   1 : APPENDIX Tissue APPENDIX SURGICAL PATHOLOGY EXAM Britney Hdez MD 2024 1946        Staff:   Circulator: Woody  Scrub Person: Kina          Findings: Erythematous and indureated appendix without perforation. No free fluid in the pelvis.    Complications:  None; patient tolerated the procedure well.     Disposition: PACU - hemodynamically stable.  Condition: stable  Specimens Collected:   ID Type Source Tests Collected by Time   1 : APPENDIX Tissue APPENDIX SURGICAL PATHOLOGY EXAM Britney Hdez MD 2024 5769     Attending Attestation: I was present and scrubbed for the entire procedure.    Britney Hdez  Phone Number: [phone]

## 2024-09-09 NOTE — ANESTHESIA PREPROCEDURE EVALUATION
Patient: Gene Rodriguez    Procedure Information       Anesthesia Start Date/Time: 09/08/24 7180    Procedure: Appendectomy Laparoscopy    Location: [facility] OR 11 / Virtual [facility] OR    Surgeons: Britney Hdez MD            Relevant Problems   Anesthesia (within normal limits)      Cardiac (within normal limits)      Pulmonary  Environmental asthma       Neuro (within normal limits)      /Renal (within normal limits)      Endocrine (within normal limits)      Hematology (within normal limits)       Clinical information reviewed:    Allergies                NPO Detail:  No data recorded     Physical Exam    Airway  Mallampati: II     Cardiovascular - normal exam  Rhythm: regular  Rate: normal     Dental - normal exam     Pulmonary - normal exam     Abdominal            Anesthesia Plan    History of general anesthesia?: yes  History of complications of general anesthesia?: no    ASA 2 - emergent     general     intravenous induction   Anesthetic plan and risks discussed with patient.  Use of blood products discussed with patient who consented to blood products.    Plan discussed with attending.

## 2024-09-09 NOTE — ANESTHESIA POSTPROCEDURE EVALUATION
Patient: Gene Rodriguez    Procedure Summary       Date: 09/08/24 Room / Location: [facility] OR 11 / Virtual [facility]    Anesthesia Start: 2240 Anesthesia Stop: 09/09/24 0024    Procedure: Appendectomy Laparoscopy Diagnosis:       Acute appendicitis, unspecified acute appendicitis type      (Acute appendicitis, unspecified acute appendicitis type [K35.80])    Surgeons: Britney Hdez MD Responsible Provider: Richar Marion DO    Anesthesia Type: general ASA Status: 2 - Emergent            Anesthesia Type: No value filed.    Vitals Value Taken Time   /63 09/09/24 0017   Temp 36.5 09/09/24 0024   Pulse 111 09/09/24 0023   Resp 15 09/09/24 0023   SpO2 97 % 09/09/24 0023   Vitals shown include unfiled device data.    Anesthesia Post Evaluation    Patient location during evaluation: PACU  Patient participation: complete - patient participated  Level of consciousness: awake and awake and alert  Pain score: 0  Pain management: adequate  Multimodal analgesia pain management approach  Airway patency: patent  Cardiovascular status: acceptable, blood pressure returned to baseline, hemodynamically stable and tachycardic  Respiratory status: acceptable, airway suctioned and face mask  Hydration status: acceptable  Postoperative Nausea and Vomiting: none        No notable events documented.

## 2024-09-10 ENCOUNTER — APPOINTMENT (OUTPATIENT)
Dept: SURGERY | Facility: CLINIC | Age: 19
End: 2024-09-10
Payer: COMMERCIAL

## 2024-09-10 ENCOUNTER — CLINICAL SUPPORT (OUTPATIENT)
Dept: SURGERY | Facility: CLINIC | Age: 19
End: 2024-09-10
Payer: COMMERCIAL

## 2024-09-10 ENCOUNTER — PHARMACY VISIT (OUTPATIENT)
Dept: PHARMACY | Facility: CLINIC | Age: 19
End: 2024-09-10
Payer: MEDICARE

## 2024-09-10 VITALS
RESPIRATION RATE: 16 BRPM | DIASTOLIC BLOOD PRESSURE: 75 MMHG | WEIGHT: 179 LBS | HEART RATE: 80 BPM | SYSTOLIC BLOOD PRESSURE: 119 MMHG | HEIGHT: 68 IN | BODY MASS INDEX: 27.13 KG/M2

## 2024-09-10 DIAGNOSIS — G89.18 POST-OPERATIVE PAIN: ICD-10-CM

## 2024-09-10 DIAGNOSIS — Z71.9 HEALTH EDUCATION/COUNSELING: Primary | ICD-10-CM

## 2024-09-10 PROCEDURE — RXMED WILLOW AMBULATORY MEDICATION CHARGE

## 2024-09-10 PROCEDURE — 99024 POSTOP FOLLOW-UP VISIT: CPT | Performed by: PHYSICIAN ASSISTANT

## 2024-09-10 RX ORDER — OXYCODONE HYDROCHLORIDE 5 MG/1
5 TABLET ORAL EVERY 6 HOURS PRN
Qty: 8 TABLET | Refills: 0 | Status: SHIPPED | OUTPATIENT
Start: 2024-09-10 | End: 2024-09-12

## 2024-09-10 ASSESSMENT — PAIN SCALES - GENERAL: PAINLEVEL: 6

## 2024-09-10 NOTE — PROGRESS NOTES
[facility]  TRAUMA CLINIC PROGRESS NOTE    Patient Name: Gene Rodriguez  MRN: [id number]  Admit Date:   : 2003  AGE: 19 y.o.   GENDER: male  ==============================================================================  CHIEF COMPLAINT:   Post op pain and swelling. Appendectomy     OTHER MEDICAL PROBLEMS:  N/A    INCIDENTAL FINDINGS:  N/A    PROCEDURES:  : Laparoscopic appendectomy     PATHOLOGY:  Not available yet   ==============================================================================  TODAY'S ASSESSMENT AND PLAN OF CARE:  WOUND CARE  - Take daily showers  - Allow warm, soapy water to wash over wound  - Do not scrub at the wound  - When out of the shower, gently pat the wound dry.  - Do not apply lotions, ointments or creams  - Avoid soaking in bodies of water (bathtub, hot tubs, pools, lakes, etc) until wound is completely healed  - No heavy lifting > 10 lbs until 2 weeks after surgery      FOLLOW UP/CALL  - No trauma/ACS follow up. Patient will call with questions or concerns. PCP referral made.   - Return to clinic or ER sooner if pt. has any development of erythema, drainage, swelling, pain, fevers, or chills  - If you have questions or concerns that are not urgent, please feel free to call  [phone].  - Call [phone] to make additional appointment(s) as needed if unable to reschedule in office today    ==============================================================================  HISTORY OF PRESENT ILLNESS  Mr. Rodriguez is a 18 y/o M that underwent laparoscopic appendectomy on  and discharged yesterday . Patient called clinic today worried about his umbilical incision and complained of increased pain and swelling in the area. Pt was advised to come in to clinic for wound check.   On arrival patient states that he is doing well overall and able to tolerate a diet. He has had a normal BM since he left the hospital. He is requesting pain  medication due to the tylenol not controlling the pain.   Patient is eating, drinking, voiding and having flatus, bowel movements.   MEDICAL HISTORY / ROS:  Admission history and ROS reviewed.   Patient denies:  fevers; chills; headache;  dizziness; chest pain; shortness of breath; nausea/vomiting/diarrhea/constipation; new/worsening abdominal pain or numbness/tingling/weakness of extremities.   Pertinent changes as follows:  N/A    PHYSICAL EXAM:  GCS 15, A+OX3, RRR, S1, S2, CTA=, no increased WOB. Abd soft, nt, mild distension. MAEx4, DAYSI 5/5 x4, no extremity edema noted. 2+pp.   Wound: laparoscopic incisions c/d/I with glue overtop. Purple bruising inferior to umbilicus incision.     LABS:  No results found for this or any previous visit (from the past 24 hour(s)).  MEDICATIONS:  Current Outpatient Medications   Medication Sig Dispense Refill    acetaminophen (Tylenol) 325 mg tablet Take 2 tablets (650 mg) by mouth every 6 hours if needed for mild pain (1 - 3) for up to 10 days. 40 tablet 0    melatonin 10 mg tablet Take 1 tablet (10 mg) by mouth as needed at bedtime.      methylphenidate (Ritalin) 10 mg tablet Take 1 tablet (10 mg) by mouth once daily as needed.      methylphenidate ER 36 mg extended release tablet Take 1 tablet (36 mg) by mouth once daily in the morning. Do not crush, chew, or split.       No current facility-administered medications for this visit.       IMAGING SUMMARY:  (summary of new imaging findings, not a copy of dictation)  N/A    I have reviewed all laboratory and imaging results ordered/pertinent for today's encounter.

## 2024-09-16 LAB
LABORATORY COMMENT REPORT: NORMAL
PATH REPORT.FINAL DX SPEC: NORMAL
PATH REPORT.GROSS SPEC: NORMAL
PATH REPORT.RELEVANT HX SPEC: NORMAL
PATH REPORT.TOTAL CANCER: NORMAL
RESIDENT REVIEW: NORMAL

## 2024-09-24 ENCOUNTER — APPOINTMENT (OUTPATIENT)
Dept: SURGERY | Facility: CLINIC | Age: 19
End: 2024-09-24
Payer: COMMERCIAL

## 2025-04-15 PROCEDURE — RXMED WILLOW AMBULATORY MEDICATION CHARGE

## 2025-04-16 ENCOUNTER — PHARMACY VISIT (OUTPATIENT)
Dept: PHARMACY | Facility: CLINIC | Age: 20
End: 2025-04-16
Payer: MEDICARE

## (undated) DEVICE — ANTIFOG, SOLUTION, FOG-OUT

## (undated) DEVICE — LIGASURE, SEALER/DIVIDER MARYLAND JAW, 5MM

## (undated) DEVICE — STAPLER, ENDO ECHELON 45MM RELOAD, WHITE, REUSABLE

## (undated) DEVICE — STAPLER, ECHELON 3000, 45MM STD

## (undated) DEVICE — ADHESIVE, SKIN, MASTISOL, 2/3 CC VIAL

## (undated) DEVICE — TUBE SET, PNEUMOCLEAR, SMOKE EVACU, HIGH-FLOW

## (undated) DEVICE — RETRIEVAL SYSTEM, MONARCH, 10MM DISP ENDOSCOPIC

## (undated) DEVICE — DRAPE, SHEET, ENDOSCOPY, GENERAL, FENESTRATED, ARMBOARD COVER, 98 X 123.5 IN, DISPOSABLE, LF, STERILE

## (undated) DEVICE — TROCAR, KII OPTICAL BLADELESS 5MM Z THREAD 100MM LNGTH

## (undated) DEVICE — STAPLER,  ENDO ECHELON 45MM RELOAD, BLUE

## (undated) DEVICE — TROCAR SYSTEM, BALLOON, KII GELPORT, 12 X 100MM

## (undated) DEVICE — Device

## (undated) DEVICE — MANIFOLD, 4 PORT NEPTUNE STANDARD

## (undated) DEVICE — COVER, CART, 45 X 27 X 48 IN, CLEAR

## (undated) DEVICE — GOWN, SURGICAL, SMARTGOWN, XLARGE, STERILE

## (undated) DEVICE — TUBE, SALEM SUMP, 16 FR X 48IN, ENFIT

## (undated) DEVICE — SCOPE WARMER, LAPAROSCOPE, BAG ONLY, LF